# Patient Record
Sex: FEMALE | Race: WHITE | NOT HISPANIC OR LATINO
[De-identification: names, ages, dates, MRNs, and addresses within clinical notes are randomized per-mention and may not be internally consistent; named-entity substitution may affect disease eponyms.]

---

## 2017-07-28 PROBLEM — Z00.00 ENCOUNTER FOR PREVENTIVE HEALTH EXAMINATION: Status: ACTIVE | Noted: 2017-07-28

## 2017-08-29 ENCOUNTER — LABORATORY RESULT (OUTPATIENT)
Age: 62
End: 2017-08-29

## 2017-08-29 ENCOUNTER — APPOINTMENT (OUTPATIENT)
Dept: RHEUMATOLOGY | Facility: CLINIC | Age: 62
End: 2017-08-29
Payer: COMMERCIAL

## 2017-08-29 VITALS
BODY MASS INDEX: 35.35 KG/M2 | TEMPERATURE: 98.3 F | WEIGHT: 197 LBS | HEIGHT: 62.5 IN | HEART RATE: 83 BPM | DIASTOLIC BLOOD PRESSURE: 84 MMHG | SYSTOLIC BLOOD PRESSURE: 132 MMHG | OXYGEN SATURATION: 98 %

## 2017-08-29 DIAGNOSIS — K21.9 GASTRO-ESOPHAGEAL REFLUX DISEASE W/OUT ESOPHAGITIS: ICD-10-CM

## 2017-08-29 DIAGNOSIS — Z82.61 FAMILY HISTORY OF ARTHRITIS: ICD-10-CM

## 2017-08-29 DIAGNOSIS — M16.9 OSTEOARTHRITIS OF HIP, UNSPECIFIED: ICD-10-CM

## 2017-08-29 DIAGNOSIS — R94.6 ABNORMAL RESULTS OF THYROID FUNCTION STUDIES: ICD-10-CM

## 2017-08-29 DIAGNOSIS — E66.9 OBESITY, UNSPECIFIED: ICD-10-CM

## 2017-08-29 DIAGNOSIS — Z87.891 PERSONAL HISTORY OF NICOTINE DEPENDENCE: ICD-10-CM

## 2017-08-29 PROCEDURE — 99244 OFF/OP CNSLTJ NEW/EST MOD 40: CPT

## 2017-08-29 RX ORDER — IBUPROFEN 800 MG
TABLET ORAL
Refills: 0 | Status: ACTIVE | COMMUNITY

## 2017-08-29 RX ORDER — OMEPRAZOLE 40 MG/1
40 CAPSULE, DELAYED RELEASE ORAL
Qty: 30 | Refills: 3 | Status: ACTIVE | COMMUNITY
Start: 2017-08-29 | End: 1900-01-01

## 2017-08-30 LAB
T3FREE SERPL-MCNC: 2.35 PG/ML
T3RU NFR SERPL: 1.17 INDEX
THYROGLOB AB SERPL-ACNC: <20 IU/ML
THYROPEROXIDASE AB SERPL IA-ACNC: 463 IU/ML
TSH SERPL-ACNC: 6.83 UIU/ML

## 2017-09-29 ENCOUNTER — RX RENEWAL (OUTPATIENT)
Age: 62
End: 2017-09-29

## 2017-09-29 RX ORDER — MELOXICAM 15 MG/1
15 TABLET ORAL DAILY
Qty: 30 | Refills: 1 | Status: ACTIVE | COMMUNITY
Start: 2017-08-29 | End: 1900-01-01

## 2018-06-11 ENCOUNTER — APPOINTMENT (OUTPATIENT)
Dept: ORTHOPEDIC SURGERY | Facility: CLINIC | Age: 63
End: 2018-06-11
Payer: COMMERCIAL

## 2018-06-11 VITALS
BODY MASS INDEX: 34.2 KG/M2 | DIASTOLIC BLOOD PRESSURE: 82 MMHG | HEART RATE: 80 BPM | SYSTOLIC BLOOD PRESSURE: 130 MMHG | WEIGHT: 193 LBS | HEIGHT: 63 IN

## 2018-06-11 DIAGNOSIS — M25.551 PAIN IN RIGHT HIP: ICD-10-CM

## 2018-06-11 PROCEDURE — 73522 X-RAY EXAM HIPS BI 3-4 VIEWS: CPT

## 2018-06-11 PROCEDURE — 99204 OFFICE O/P NEW MOD 45 MIN: CPT

## 2018-06-12 ENCOUNTER — TRANSCRIPTION ENCOUNTER (OUTPATIENT)
Age: 63
End: 2018-06-12

## 2018-06-18 ENCOUNTER — MOBILE ON CALL (OUTPATIENT)
Age: 63
End: 2018-06-18

## 2018-06-18 LAB
CRP SERPL-MCNC: 0.7 MG/DL
ERYTHROCYTE [SEDIMENTATION RATE] IN BLOOD BY WESTERGREN METHOD: 31 MM/HR

## 2018-06-20 ENCOUNTER — MOBILE ON CALL (OUTPATIENT)
Age: 63
End: 2018-06-20

## 2018-06-22 ENCOUNTER — CHART COPY (OUTPATIENT)
Age: 63
End: 2018-06-22

## 2018-06-22 LAB
COBALT: 1.9 UG/L
CR BLD-MCNC: 2.1 UG/L

## 2018-07-09 ENCOUNTER — APPOINTMENT (OUTPATIENT)
Dept: ORTHOPEDIC SURGERY | Facility: CLINIC | Age: 63
End: 2018-07-09
Payer: COMMERCIAL

## 2018-07-09 DIAGNOSIS — Z85.9 PERSONAL HISTORY OF MALIGNANT NEOPLASM, UNSPECIFIED: ICD-10-CM

## 2018-07-09 DIAGNOSIS — Z87.39 PERSONAL HISTORY OF OTHER DISEASES OF THE MUSCULOSKELETAL SYSTEM AND CONNECTIVE TISSUE: ICD-10-CM

## 2018-07-09 PROCEDURE — 73502 X-RAY EXAM HIP UNI 2-3 VIEWS: CPT | Mod: LT

## 2018-07-09 PROCEDURE — 99214 OFFICE O/P EST MOD 30 MIN: CPT

## 2018-09-14 ENCOUNTER — MOBILE ON CALL (OUTPATIENT)
Age: 63
End: 2018-09-14

## 2018-09-17 ENCOUNTER — OUTPATIENT (OUTPATIENT)
Dept: OUTPATIENT SERVICES | Facility: HOSPITAL | Age: 63
LOS: 1 days | End: 2018-09-17
Payer: COMMERCIAL

## 2018-09-17 VITALS
WEIGHT: 197.09 LBS | OXYGEN SATURATION: 100 % | HEART RATE: 73 BPM | DIASTOLIC BLOOD PRESSURE: 86 MMHG | TEMPERATURE: 98 F | SYSTOLIC BLOOD PRESSURE: 135 MMHG | RESPIRATION RATE: 14 BRPM | HEIGHT: 63 IN

## 2018-09-17 DIAGNOSIS — E03.9 HYPOTHYROIDISM, UNSPECIFIED: ICD-10-CM

## 2018-09-17 DIAGNOSIS — M16.12 UNILATERAL PRIMARY OSTEOARTHRITIS, LEFT HIP: ICD-10-CM

## 2018-09-17 DIAGNOSIS — Z98.890 OTHER SPECIFIED POSTPROCEDURAL STATES: Chronic | ICD-10-CM

## 2018-09-17 DIAGNOSIS — Z96.641 PRESENCE OF RIGHT ARTIFICIAL HIP JOINT: Chronic | ICD-10-CM

## 2018-09-17 DIAGNOSIS — Z90.49 ACQUIRED ABSENCE OF OTHER SPECIFIED PARTS OF DIGESTIVE TRACT: Chronic | ICD-10-CM

## 2018-09-17 DIAGNOSIS — Z01.818 ENCOUNTER FOR OTHER PREPROCEDURAL EXAMINATION: ICD-10-CM

## 2018-09-17 DIAGNOSIS — Z90.710 ACQUIRED ABSENCE OF BOTH CERVIX AND UTERUS: Chronic | ICD-10-CM

## 2018-09-17 DIAGNOSIS — Z29.9 ENCOUNTER FOR PROPHYLACTIC MEASURES, UNSPECIFIED: ICD-10-CM

## 2018-09-17 LAB
ANION GAP SERPL CALC-SCNC: 12 MMOL/L — SIGNIFICANT CHANGE UP (ref 5–17)
BLD GP AB SCN SERPL QL: NEGATIVE — SIGNIFICANT CHANGE UP
BUN SERPL-MCNC: 20 MG/DL — SIGNIFICANT CHANGE UP (ref 7–23)
CALCIUM SERPL-MCNC: 9.4 MG/DL — SIGNIFICANT CHANGE UP (ref 8.4–10.5)
CHLORIDE SERPL-SCNC: 104 MMOL/L — SIGNIFICANT CHANGE UP (ref 96–108)
CO2 SERPL-SCNC: 24 MMOL/L — SIGNIFICANT CHANGE UP (ref 22–31)
CREAT SERPL-MCNC: 0.9 MG/DL — SIGNIFICANT CHANGE UP (ref 0.5–1.3)
GLUCOSE SERPL-MCNC: 75 MG/DL — SIGNIFICANT CHANGE UP (ref 70–99)
HBA1C BLD-MCNC: 5.2 % — SIGNIFICANT CHANGE UP (ref 4–5.6)
HCT VFR BLD CALC: 37.8 % — SIGNIFICANT CHANGE UP (ref 34.5–45)
HGB BLD-MCNC: 12.1 G/DL — SIGNIFICANT CHANGE UP (ref 11.5–15.5)
MCHC RBC-ENTMCNC: 30.7 PG — SIGNIFICANT CHANGE UP (ref 27–34)
MCHC RBC-ENTMCNC: 32 GM/DL — SIGNIFICANT CHANGE UP (ref 32–36)
MCV RBC AUTO: 95.9 FL — SIGNIFICANT CHANGE UP (ref 80–100)
PLATELET # BLD AUTO: 255 K/UL — SIGNIFICANT CHANGE UP (ref 150–400)
POTASSIUM SERPL-MCNC: 4.4 MMOL/L — SIGNIFICANT CHANGE UP (ref 3.5–5.3)
POTASSIUM SERPL-SCNC: 4.4 MMOL/L — SIGNIFICANT CHANGE UP (ref 3.5–5.3)
RBC # BLD: 3.94 M/UL — SIGNIFICANT CHANGE UP (ref 3.8–5.2)
RBC # FLD: 13.6 % — SIGNIFICANT CHANGE UP (ref 10.3–14.5)
RH IG SCN BLD-IMP: POSITIVE — SIGNIFICANT CHANGE UP
SODIUM SERPL-SCNC: 140 MMOL/L — SIGNIFICANT CHANGE UP (ref 135–145)
WBC # BLD: 7.37 K/UL — SIGNIFICANT CHANGE UP (ref 3.8–10.5)
WBC # FLD AUTO: 7.37 K/UL — SIGNIFICANT CHANGE UP (ref 3.8–10.5)

## 2018-09-17 PROCEDURE — 87640 STAPH A DNA AMP PROBE: CPT

## 2018-09-17 PROCEDURE — 86901 BLOOD TYPING SEROLOGIC RH(D): CPT

## 2018-09-17 PROCEDURE — 87641 MR-STAPH DNA AMP PROBE: CPT

## 2018-09-17 PROCEDURE — 80048 BASIC METABOLIC PNL TOTAL CA: CPT

## 2018-09-17 PROCEDURE — 85027 COMPLETE CBC AUTOMATED: CPT

## 2018-09-17 PROCEDURE — 86850 RBC ANTIBODY SCREEN: CPT

## 2018-09-17 PROCEDURE — G0463: CPT

## 2018-09-17 PROCEDURE — 83036 HEMOGLOBIN GLYCOSYLATED A1C: CPT

## 2018-09-17 PROCEDURE — 86900 BLOOD TYPING SEROLOGIC ABO: CPT

## 2018-09-17 RX ORDER — VANCOMYCIN HCL 1 G
1250 VIAL (EA) INTRAVENOUS ONCE
Qty: 0 | Refills: 0 | Status: DISCONTINUED | OUTPATIENT
Start: 2018-09-28 | End: 2018-09-29

## 2018-09-17 NOTE — H&P PST ADULT - NEGATIVE NEUROLOGICAL SYMPTOMS
no headache/no vertigo/no syncope/no paresthesias/no tremors/no generalized seizures/no focal seizures

## 2018-09-17 NOTE — H&P PST ADULT - HISTORY OF PRESENT ILLNESS
Mrs. Pemberton is a 62 year old woman with PMH former smoker, hypothyroidism and osteoarthritis s/p R THR who has had worsening pain in left hip and increased difficulty walking and is now scheduled for L THR.

## 2018-09-17 NOTE — H&P PST ADULT - ASSESSMENT
CAPRINI SCORE [CLOT]    AGE RELATED RISK FACTORS                                                       MOBILITY RELATED FACTORS  [ ] Age 41-60 years                                            (1 Point)                  [ ] Bed rest                                                        (1 Point)  [x ] Age: 61-74 years                                           (2 Points)                 [ ] Plaster cast                                                   (2 Points)  [ ] Age= 75 years                                              (3 Points)                 [ ] Bed bound for more than 72 hours                 (2 Points)    DISEASE RELATED RISK FACTORS                                               GENDER SPECIFIC FACTORS  [ ] Edema in the lower extremities                       (1 Point)                  [ ] Pregnancy                                                     (1 Point)  [ ] Varicose veins                                               (1 Point)                  [ ] Post-partum < 6 weeks                                   (1 Point)             [x ] BMI > 25 Kg/m2                                            (1 Point)                  [ ] Hormonal therapy  or oral contraception          (1 Point)                 [ ] Sepsis (in the previous month)                        (1 Point)                  [ ] History of pregnancy complications                 (1 point)  [ ] Pneumonia or serious lung disease                                               [ ] Unexplained or recurrent                     (1 Point)           (in the previous month)                               (1 Point)  [ ] Abnormal pulmonary function test                     (1 Point)                 SURGERY RELATED RISK FACTORS  [ ] Acute myocardial infarction                              (1 Point)                 [ ]  Section                                             (1 Point)  [ ] Congestive heart failure (in the previous month)  (1 Point)               [ ] Minor surgery                                                  (1 Point)   [ ] Inflammatory bowel disease                             (1 Point)                 [ ] Arthroscopic surgery                                        (2 Points)  [ ] Central venous access                                      (2 Points)                [ ] General surgery lasting more than 45 minutes   (2 Points)       [ ] Stroke (in the previous month)                          (5 Points)               [x ] Elective arthroplasty                                         (5 Points)                                                                                                                                               HEMATOLOGY RELATED FACTORS                                                 TRAUMA RELATED RISK FACTORS  [ ] Prior episodes of VTE                                     (3 Points)                 [ ] Fracture of the hip, pelvis, or leg                       (5 Points)  [ ] Positive family history for VTE                         (3 Points)                 [ ] Acute spinal cord injury (in the previous month)  (5 Points)  [ ] Prothrombin 61616 A                                     (3 Points)                 [ ] Paralysis  (less than 1 month)                             (5 Points)  [ ] Factor V Leiden                                             (3 Points)                  [ ] Multiple Trauma within 1 month                        (5 Points)  [ ] Lupus anticoagulants                                     (3 Points)                                                           [ ] Anticardiolipin antibodies                               (3 Points)                                                       [ ] High homocysteine in the blood                      (3 Points)                                             [ ] Other congenital or acquired thrombophilia      (3 Points)                                                [ ] Heparin induced thrombocytopenia                  (3 Points)                                          Total Score [    8      ]

## 2018-09-17 NOTE — H&P PST ADULT - PSH
H/O cosmetic surgery  Nose 2000  H/O hemorrhoidectomy  2000  H/O: hysterectomy  age 49  History of hip replacement, total, right  2004  S/P laparoscopic cholecystectomy  4/2018

## 2018-09-17 NOTE — H&P PST ADULT - PMH
Former smoker, stopped smoking many years ago  Quit 1992  Hypothyroidism, unspecified type    Primary osteoarthritis involving multiple joints

## 2018-09-17 NOTE — H&P PST ADULT - ALLERGY TYPES
indoor environmental allergies/reactions to medicines/reactions to food/raw almonds, tree nuts, fresh fruit but not citrus/outdoor environmental allergies

## 2018-09-17 NOTE — H&P PST ADULT - PROBLEM SELECTOR PLAN 1
Left total hip replacement scheduled  Labs pending  MRSA nasal swab pending  Medical evaluation is 9/18/18  Dental evaluation in chart.  Preop instructions given  Zofran preop for hx of severe n/v with previous surgeries/anesthesia Left total hip replacement scheduled  Labs pending  MRSA nasal swab pending  Medical evaluation is 9/18/18  Dental evaluation in chart.  Preop instructions given  Recommend antiemetic before surgery, received Zofran and Reglan last surgery with good results.

## 2018-09-17 NOTE — H&P PST ADULT - NSANTHOSAYNRD_GEN_A_CORE
No. TIM screening performed.  STOP BANG Legend: 0-2 = LOW Risk; 3-4 = INTERMEDIATE Risk; 5-8 = HIGH Risk

## 2018-09-18 LAB
MRSA PCR RESULT.: SIGNIFICANT CHANGE UP
S AUREUS DNA NOSE QL NAA+PROBE: SIGNIFICANT CHANGE UP

## 2018-09-27 ENCOUNTER — TRANSCRIPTION ENCOUNTER (OUTPATIENT)
Age: 63
End: 2018-09-27

## 2018-09-27 PROBLEM — M16.12 ARTHRITIS OF LEFT HIP: Status: RESOLVED | Noted: 2018-06-11 | Resolved: 2018-09-27

## 2018-09-27 PROBLEM — M25.50 CHRONIC JOINT PAIN: Status: RESOLVED | Noted: 2017-08-29 | Resolved: 2018-09-27

## 2018-09-28 ENCOUNTER — INPATIENT (INPATIENT)
Facility: HOSPITAL | Age: 63
LOS: 0 days | Discharge: HOME | DRG: 470 | End: 2018-09-29
Attending: ORTHOPAEDIC SURGERY | Admitting: ORTHOPAEDIC SURGERY
Payer: COMMERCIAL

## 2018-09-28 ENCOUNTER — APPOINTMENT (OUTPATIENT)
Dept: ORTHOPEDIC SURGERY | Facility: HOSPITAL | Age: 63
End: 2018-09-28

## 2018-09-28 VITALS
SYSTOLIC BLOOD PRESSURE: 154 MMHG | HEIGHT: 63 IN | WEIGHT: 197.09 LBS | DIASTOLIC BLOOD PRESSURE: 86 MMHG | TEMPERATURE: 98 F | OXYGEN SATURATION: 100 % | RESPIRATION RATE: 20 BRPM | HEART RATE: 74 BPM

## 2018-09-28 DIAGNOSIS — G89.29 PAIN IN UNSPECIFIED JOINT: ICD-10-CM

## 2018-09-28 DIAGNOSIS — M16.12 UNILATERAL PRIMARY OSTEOARTHRITIS, LEFT HIP: ICD-10-CM

## 2018-09-28 DIAGNOSIS — Z96.641 PRESENCE OF RIGHT ARTIFICIAL HIP JOINT: Chronic | ICD-10-CM

## 2018-09-28 DIAGNOSIS — Z90.49 ACQUIRED ABSENCE OF OTHER SPECIFIED PARTS OF DIGESTIVE TRACT: Chronic | ICD-10-CM

## 2018-09-28 DIAGNOSIS — M25.50 PAIN IN UNSPECIFIED JOINT: ICD-10-CM

## 2018-09-28 DIAGNOSIS — Z98.890 OTHER SPECIFIED POSTPROCEDURAL STATES: Chronic | ICD-10-CM

## 2018-09-28 DIAGNOSIS — Z90.710 ACQUIRED ABSENCE OF BOTH CERVIX AND UTERUS: Chronic | ICD-10-CM

## 2018-09-28 LAB — RH IG SCN BLD-IMP: POSITIVE — SIGNIFICANT CHANGE UP

## 2018-09-28 PROCEDURE — 27130 TOTAL HIP ARTHROPLASTY: CPT | Mod: LT

## 2018-09-28 PROCEDURE — 72170 X-RAY EXAM OF PELVIS: CPT | Mod: 26

## 2018-09-28 RX ORDER — OXYCODONE HYDROCHLORIDE 5 MG/1
10 TABLET ORAL EVERY 4 HOURS
Qty: 0 | Refills: 0 | Status: DISCONTINUED | OUTPATIENT
Start: 2018-09-28 | End: 2018-09-29

## 2018-09-28 RX ORDER — POLYETHYLENE GLYCOL 3350 17 G/17G
17 POWDER, FOR SOLUTION ORAL DAILY
Qty: 0 | Refills: 0 | Status: DISCONTINUED | OUTPATIENT
Start: 2018-09-28 | End: 2018-09-29

## 2018-09-28 RX ORDER — ACETAMINOPHEN 500 MG
1000 TABLET ORAL ONCE
Qty: 0 | Refills: 0 | Status: COMPLETED | OUTPATIENT
Start: 2018-09-28 | End: 2018-09-28

## 2018-09-28 RX ORDER — ACETAMINOPHEN 500 MG
975 TABLET ORAL ONCE
Qty: 0 | Refills: 0 | Status: COMPLETED | OUTPATIENT
Start: 2018-09-28 | End: 2018-09-28

## 2018-09-28 RX ORDER — SODIUM CHLORIDE 9 MG/ML
500 INJECTION INTRAMUSCULAR; INTRAVENOUS; SUBCUTANEOUS ONCE
Qty: 0 | Refills: 0 | Status: COMPLETED | OUTPATIENT
Start: 2018-09-28 | End: 2018-09-28

## 2018-09-28 RX ORDER — SODIUM CHLORIDE 9 MG/ML
3 INJECTION INTRAMUSCULAR; INTRAVENOUS; SUBCUTANEOUS EVERY 8 HOURS
Qty: 0 | Refills: 0 | Status: DISCONTINUED | OUTPATIENT
Start: 2018-09-28 | End: 2018-09-28

## 2018-09-28 RX ORDER — INFLUENZA VIRUS VACCINE 15; 15; 15; 15 UG/.5ML; UG/.5ML; UG/.5ML; UG/.5ML
0.5 SUSPENSION INTRAMUSCULAR ONCE
Qty: 0 | Refills: 0 | Status: DISCONTINUED | OUTPATIENT
Start: 2018-09-28 | End: 2018-09-29

## 2018-09-28 RX ORDER — PANTOPRAZOLE SODIUM 20 MG/1
40 TABLET, DELAYED RELEASE ORAL ONCE
Qty: 0 | Refills: 0 | Status: COMPLETED | OUTPATIENT
Start: 2018-09-28 | End: 2018-09-28

## 2018-09-28 RX ORDER — ACETAMINOPHEN 500 MG
975 TABLET ORAL EVERY 8 HOURS
Qty: 0 | Refills: 0 | Status: DISCONTINUED | OUTPATIENT
Start: 2018-09-29 | End: 2018-09-29

## 2018-09-28 RX ORDER — DOCUSATE SODIUM 100 MG
100 CAPSULE ORAL THREE TIMES A DAY
Qty: 0 | Refills: 0 | Status: DISCONTINUED | OUTPATIENT
Start: 2018-09-28 | End: 2018-09-29

## 2018-09-28 RX ORDER — VANCOMYCIN HCL 1 G
1250 VIAL (EA) INTRAVENOUS EVERY 8 HOURS
Qty: 0 | Refills: 0 | Status: DISCONTINUED | OUTPATIENT
Start: 2018-09-28 | End: 2018-09-29

## 2018-09-28 RX ORDER — OXYCODONE HYDROCHLORIDE 5 MG/1
5 TABLET ORAL EVERY 4 HOURS
Qty: 0 | Refills: 0 | Status: DISCONTINUED | OUTPATIENT
Start: 2018-09-28 | End: 2018-09-29

## 2018-09-28 RX ORDER — SODIUM CHLORIDE 9 MG/ML
500 INJECTION INTRAMUSCULAR; INTRAVENOUS; SUBCUTANEOUS ONCE
Qty: 0 | Refills: 0 | Status: COMPLETED | OUTPATIENT
Start: 2018-09-29 | End: 2018-09-29

## 2018-09-28 RX ORDER — TRAMADOL HYDROCHLORIDE 50 MG/1
50 TABLET ORAL ONCE
Qty: 0 | Refills: 0 | Status: DISCONTINUED | OUTPATIENT
Start: 2018-09-28 | End: 2018-09-28

## 2018-09-28 RX ORDER — KETOROLAC TROMETHAMINE 30 MG/ML
30 SYRINGE (ML) INJECTION EVERY 8 HOURS
Qty: 0 | Refills: 0 | Status: DISCONTINUED | OUTPATIENT
Start: 2018-09-28 | End: 2018-09-29

## 2018-09-28 RX ORDER — DEXAMETHASONE 0.5 MG/5ML
8 ELIXIR ORAL ONCE
Qty: 0 | Refills: 0 | Status: COMPLETED | OUTPATIENT
Start: 2018-09-29 | End: 2018-09-29

## 2018-09-28 RX ORDER — MAGNESIUM HYDROXIDE 400 MG/1
30 TABLET, CHEWABLE ORAL DAILY
Qty: 0 | Refills: 0 | Status: DISCONTINUED | OUTPATIENT
Start: 2018-09-28 | End: 2018-09-29

## 2018-09-28 RX ORDER — HYDROMORPHONE HYDROCHLORIDE 2 MG/ML
0.5 INJECTION INTRAMUSCULAR; INTRAVENOUS; SUBCUTANEOUS
Qty: 0 | Refills: 0 | Status: DISCONTINUED | OUTPATIENT
Start: 2018-09-28 | End: 2018-09-28

## 2018-09-28 RX ORDER — ASPIRIN/CALCIUM CARB/MAGNESIUM 324 MG
81 TABLET ORAL AT BEDTIME
Qty: 0 | Refills: 0 | Status: DISCONTINUED | OUTPATIENT
Start: 2018-09-29 | End: 2018-09-29

## 2018-09-28 RX ORDER — SENNA PLUS 8.6 MG/1
2 TABLET ORAL AT BEDTIME
Qty: 0 | Refills: 0 | Status: DISCONTINUED | OUTPATIENT
Start: 2018-09-28 | End: 2018-09-29

## 2018-09-28 RX ORDER — LIDOCAINE HCL 20 MG/ML
0.2 VIAL (ML) INJECTION ONCE
Qty: 0 | Refills: 0 | Status: DISCONTINUED | OUTPATIENT
Start: 2018-09-28 | End: 2018-09-28

## 2018-09-28 RX ORDER — TRAMADOL HYDROCHLORIDE 50 MG/1
50 TABLET ORAL EVERY 6 HOURS
Qty: 0 | Refills: 0 | Status: DISCONTINUED | OUTPATIENT
Start: 2018-09-28 | End: 2018-09-29

## 2018-09-28 RX ORDER — SODIUM CHLORIDE 9 MG/ML
1000 INJECTION INTRAMUSCULAR; INTRAVENOUS; SUBCUTANEOUS
Qty: 0 | Refills: 0 | Status: DISCONTINUED | OUTPATIENT
Start: 2018-09-28 | End: 2018-09-29

## 2018-09-28 RX ORDER — ACETAMINOPHEN 500 MG
1000 TABLET ORAL ONCE
Qty: 0 | Refills: 0 | Status: COMPLETED | OUTPATIENT
Start: 2018-09-29 | End: 2018-09-29

## 2018-09-28 RX ORDER — PANTOPRAZOLE SODIUM 20 MG/1
40 TABLET, DELAYED RELEASE ORAL DAILY
Qty: 0 | Refills: 0 | Status: DISCONTINUED | OUTPATIENT
Start: 2018-09-28 | End: 2018-09-29

## 2018-09-28 RX ORDER — ONDANSETRON 8 MG/1
4 TABLET, FILM COATED ORAL EVERY 6 HOURS
Qty: 0 | Refills: 0 | Status: DISCONTINUED | OUTPATIENT
Start: 2018-09-28 | End: 2018-09-29

## 2018-09-28 RX ADMIN — PANTOPRAZOLE SODIUM 40 MILLIGRAM(S): 20 TABLET, DELAYED RELEASE ORAL at 08:52

## 2018-09-28 RX ADMIN — SODIUM CHLORIDE 1000 MILLILITER(S): 9 INJECTION INTRAMUSCULAR; INTRAVENOUS; SUBCUTANEOUS at 17:43

## 2018-09-28 RX ADMIN — Medication 100 MILLIGRAM(S): at 21:23

## 2018-09-28 RX ADMIN — SODIUM CHLORIDE 3 MILLILITER(S): 9 INJECTION INTRAMUSCULAR; INTRAVENOUS; SUBCUTANEOUS at 08:53

## 2018-09-28 RX ADMIN — SODIUM CHLORIDE 1000 MILLILITER(S): 9 INJECTION INTRAMUSCULAR; INTRAVENOUS; SUBCUTANEOUS at 13:57

## 2018-09-28 RX ADMIN — Medication 166.67 MILLIGRAM(S): at 21:23

## 2018-09-28 RX ADMIN — Medication 1000 MILLIGRAM(S): at 22:30

## 2018-09-28 RX ADMIN — SODIUM CHLORIDE 75 MILLILITER(S): 9 INJECTION INTRAMUSCULAR; INTRAVENOUS; SUBCUTANEOUS at 13:54

## 2018-09-28 RX ADMIN — TRAMADOL HYDROCHLORIDE 50 MILLIGRAM(S): 50 TABLET ORAL at 09:03

## 2018-09-28 RX ADMIN — Medication 400 MILLIGRAM(S): at 21:23

## 2018-09-28 RX ADMIN — Medication 975 MILLIGRAM(S): at 08:52

## 2018-09-28 RX ADMIN — Medication 30 MILLIGRAM(S): at 21:23

## 2018-09-28 RX ADMIN — Medication 30 MILLIGRAM(S): at 22:30

## 2018-09-28 RX ADMIN — HYDROMORPHONE HYDROCHLORIDE 0.5 MILLIGRAM(S): 2 INJECTION INTRAMUSCULAR; INTRAVENOUS; SUBCUTANEOUS at 14:00

## 2018-09-28 RX ADMIN — HYDROMORPHONE HYDROCHLORIDE 0.5 MILLIGRAM(S): 2 INJECTION INTRAMUSCULAR; INTRAVENOUS; SUBCUTANEOUS at 13:39

## 2018-09-28 NOTE — PHYSICAL THERAPY INITIAL EVALUATION ADULT - ADDITIONAL COMMENTS
Pt lives with her spouse in a split level home +5 steps to enter with HR, 6 + 6 steps once inside with HR. Pt reports she was ambulating independently with use of straight cane and was independent with all ADLs prior

## 2018-09-28 NOTE — PHYSICAL THERAPY INITIAL EVALUATION ADULT - STRENGTHENING, PT EVAL
GOAL: Pt will improve bilateral LE strength by 1/2 grade on MMT, for increased limb stability, to improve gait and facilitate stair negotiation in 2 weeks.

## 2018-09-28 NOTE — PATIENT PROFILE ADULT. - PATIENT REPRESENTATIVE: ( YOU CAN CHOOSE ANY PERSON THAT CAN ASSIST YOU WITH YOUR HEALTH CARE PREFERENCES, DOES NOT HAVE TO BE A SPOUSE, IMMEDIATE FAMILY OR SIGNIFICANT OTHER/PARTNER)
"Libra Pena's goals for this visit include: recheckj  She requests these members of her care team be copied on today's visit information:     PCP: Carmelina Sims    Referring Provider:  No referring provider defined for this encounter.    Chief Complaint   Patient presents with     RECHECK       Initial /70  Pulse 56  Ht 1.626 m (5' 4\")  Wt 69.9 kg (154 lb)  LMP 12/16/2011  SpO2 99%  BMI 26.43 kg/m2 Estimated body mass index is 26.43 kg/(m^2) as calculated from the following:    Height as of this encounter: 1.626 m (5' 4\").    Weight as of this encounter: 69.9 kg (154 lb).  Medication Reconciliation: complete    "
Declines

## 2018-09-28 NOTE — PHYSICAL THERAPY INITIAL EVALUATION ADULT - MANUAL MUSCLE TESTING RESULTS, REHAB EVAL
B/L UEs and RLE grossly 4/5, LLE not tested with MMT 2/2 post op c/o stiffness/grossly assessed due to

## 2018-09-28 NOTE — PHYSICAL THERAPY INITIAL EVALUATION ADULT - DISCHARGE DISPOSITION, PT EVAL
TBD upon further functional evaluation Home with Home PT to assess safety, increase strength and endurance assisting with functional activities and ADLs. Pt given straight cane.Pt requires Rolling walker for home.

## 2018-09-28 NOTE — BRIEF OPERATIVE NOTE - PROCEDURE
<<-----Click on this checkbox to enter Procedure Arthroplasty, hip, total, anterior approach  09/28/2018    Active  AROSS7

## 2018-09-28 NOTE — PHYSICAL THERAPY INITIAL EVALUATION ADULT - PERTINENT HX OF CURRENT PROBLEM, REHAB EVAL
62 year old woman with PMH former smoker, hypothyroidism and osteoarthritis s/p R THR who has had worsening pain in left hip and increased difficulty walking, now s/p L anterior HOLA on 9/28

## 2018-09-28 NOTE — CHART NOTE - NSCHARTNOTEFT_GEN_A_CORE
Subjective: Patient is resting comfortably in PACU. Patient states that she feels a mild burning sensation around her left hip however denies any pain. Patient further denies fever, chills, nausea, chills, chest pain or shortness of breath.     Objective:  Vital Signs Last 24 Hrs  T(C): 36.2 (28 Sep 2018 15:00), Max: 36.6 (28 Sep 2018 08:22)  T(F): 97.2 (28 Sep 2018 15:00), Max: 97.9 (28 Sep 2018 08:22)  HR: 65 (28 Sep 2018 15:00) (57 - 74)  BP: 130/80 (28 Sep 2018 15:00) (112/69 - 154/86)  BP(mean): 100 (28 Sep 2018 15:00) (91 - 107)  RR: 18 (28 Sep 2018 15:00) (14 - 20)  SpO2: 98% (28 Sep 2018 15:00) (98% - 100%)    PE:  Alert and oriented, NAD  Cardiac: + S1, S2, regular rate and rhythm  Pulm: lungs clear to ascultation bilaterally, no wheezes, rales or rhonchi   LLE: Aquacel dressing clean, dry and intact        Compartments soft, calves soft and nontender bilaterally        DF/PF, EHL/FHL strength 5/5        Sensation equally intact bilaterally         2 + DP pulse     Post Op x-ray pelvis: bilateral HOLA; right press fit, left cemented     Assessment: 62 year old female post-op left total hip arthroplasty for left hip osteoarthritis. Patient's vital signs are stable and presents with no known acute abnormalities.     Plan:   PT/OT weight bearing as tolerated, anterior precautions   DVT Prophylaxis: ASA 81 mg BID  Abx Prophylaxis: Vancomycin x 1 dose  Pain management: Tylenol 975 mg every 8 hours, Toradol 30 mg IV push every 8 hours x 2 days, Oxycodone 5-10 mg every 4 hours as needed  Check AM labs CBC, BMP  Continue current treatment  Dispo planning TBA    YESENIA Kaur  Orthopedics   Beeper [id number] Subjective: Patient is resting comfortably in PACU. Patient states that she feels a mild burning sensation around her left hip however denies any pain. Patient further denies fever, chills, nausea, chills, chest pain or shortness of breath.     Objective:  Vital Signs Last 24 Hrs  T(C): 36.2 (28 Sep 2018 15:00), Max: 36.6 (28 Sep 2018 08:22)  T(F): 97.2 (28 Sep 2018 15:00), Max: 97.9 (28 Sep 2018 08:22)  HR: 65 (28 Sep 2018 15:00) (57 - 74)  BP: 130/80 (28 Sep 2018 15:00) (112/69 - 154/86)  BP(mean): 100 (28 Sep 2018 15:00) (91 - 107)  RR: 18 (28 Sep 2018 15:00) (14 - 20)  SpO2: 98% (28 Sep 2018 15:00) (98% - 100%)    PE:  Alert and oriented, NAD  Cardiac: + S1, S2, regular rate and rhythm  Pulm: lungs clear to ascultation bilaterally, no wheezes, rales or rhonchi   LLE: Aquacel dressing clean, dry and intact        Compartments soft, calves soft and nontender bilaterally        DF/PF, EHL/FHL strength 5/5        Sensation equally intact bilaterally         2 + DP pulse     Post Op x-ray pelvis: bilateral HOLA; right press fit, left cemented     Assessment: 62 year old female post-op left total hip arthroplasty for left hip osteoarthritis. Patient's vital signs are stable and presents with no known acute abnormalities.     Plan:   PT/OT weight bearing as tolerated, anterior precautions   DVT Prophylaxis: ASA 81 mg BID  Abx Prophylaxis: Vancomycin x 1 dose  Pain management: Tylenol 975 mg every 8 hours, Toradol 30 mg IV push every 8 hours x 2 days, Oxycodone 5-10 mg every 4 hours as needed  Check AM labs CBC, BMP  Continue current treatment  Dispo planning TBA    YESENIA Kaur  Orthopedics   Beeper [id number]      I agree with the above note and have personally seen and examined this patient. All pertinent films have been reviewed. Please refer to clinical documentation of the history, physical examinations, data summary, and both assessment and plan as documented above and with which I agree.    pain controlled  not yet oob w pt    xr: s/po L HOLA, no complications    afvss  nad  lle  dressing cdi  firing q/h, 5/5 ta/ehl/gcs  silt l4-s1  2+ dp    doing well s/p L HOLA  vanc x 24 hrs  asa 81 bid x 4 weeks  f/u office 2 weeks for wound check  wbat, pt/ot    Thiago Becerril MD  Attending Orthopedic Surgeon

## 2018-09-28 NOTE — PHYSICAL THERAPY INITIAL EVALUATION ADULT - ACTIVE RANGE OF MOTION EXAMINATION, REHAB EVAL
Left LE Active ROM was WFL (within functional limits)/LLE hip flexion AROM limited 2/2 stiffness/Left UE Active ROM was WFL (within functional limits)/Right UE Active ROM was WFL (within functional limits)/Right LE Active ROM was WFL (within functional limits)

## 2018-09-28 NOTE — PRE-OP CHECKLIST - HIBICLENS SHOWER 3 DATE
Letter sent about benign path report and that no further tx is needed. I asked the patient to call with questions/ concerns. 28-Sep-2018

## 2018-09-29 ENCOUNTER — TRANSCRIPTION ENCOUNTER (OUTPATIENT)
Age: 63
End: 2018-09-29

## 2018-09-29 VITALS
OXYGEN SATURATION: 95 % | TEMPERATURE: 98 F | RESPIRATION RATE: 18 BRPM | DIASTOLIC BLOOD PRESSURE: 69 MMHG | SYSTOLIC BLOOD PRESSURE: 111 MMHG | HEART RATE: 90 BPM

## 2018-09-29 LAB
ANION GAP SERPL CALC-SCNC: 9 MMOL/L — SIGNIFICANT CHANGE UP (ref 5–17)
BUN SERPL-MCNC: 17 MG/DL — SIGNIFICANT CHANGE UP (ref 7–23)
CALCIUM SERPL-MCNC: 8.2 MG/DL — LOW (ref 8.4–10.5)
CHLORIDE SERPL-SCNC: 108 MMOL/L — SIGNIFICANT CHANGE UP (ref 96–108)
CO2 SERPL-SCNC: 21 MMOL/L — LOW (ref 22–31)
CREAT SERPL-MCNC: 0.86 MG/DL — SIGNIFICANT CHANGE UP (ref 0.5–1.3)
GLUCOSE SERPL-MCNC: 92 MG/DL — SIGNIFICANT CHANGE UP (ref 70–99)
HCT VFR BLD CALC: 28.6 % — LOW (ref 34.5–45)
HGB BLD-MCNC: 9.3 G/DL — LOW (ref 11.5–15.5)
MCHC RBC-ENTMCNC: 30.6 PG — SIGNIFICANT CHANGE UP (ref 27–34)
MCHC RBC-ENTMCNC: 32.5 GM/DL — SIGNIFICANT CHANGE UP (ref 32–36)
MCV RBC AUTO: 94.1 FL — SIGNIFICANT CHANGE UP (ref 80–100)
PLATELET # BLD AUTO: 227 K/UL — SIGNIFICANT CHANGE UP (ref 150–400)
POTASSIUM SERPL-MCNC: 4.4 MMOL/L — SIGNIFICANT CHANGE UP (ref 3.5–5.3)
POTASSIUM SERPL-SCNC: 4.4 MMOL/L — SIGNIFICANT CHANGE UP (ref 3.5–5.3)
RBC # BLD: 3.04 M/UL — LOW (ref 3.8–5.2)
RBC # FLD: 13.6 % — SIGNIFICANT CHANGE UP (ref 10.3–14.5)
SODIUM SERPL-SCNC: 138 MMOL/L — SIGNIFICANT CHANGE UP (ref 135–145)
WBC # BLD: 13.37 K/UL — HIGH (ref 3.8–10.5)
WBC # FLD AUTO: 13.37 K/UL — HIGH (ref 3.8–10.5)

## 2018-09-29 PROCEDURE — C1769: CPT

## 2018-09-29 PROCEDURE — 97165 OT EVAL LOW COMPLEX 30 MIN: CPT

## 2018-09-29 PROCEDURE — 72170 X-RAY EXAM OF PELVIS: CPT

## 2018-09-29 PROCEDURE — 97161 PT EVAL LOW COMPLEX 20 MIN: CPT

## 2018-09-29 PROCEDURE — 85027 COMPLETE CBC AUTOMATED: CPT

## 2018-09-29 PROCEDURE — 80048 BASIC METABOLIC PNL TOTAL CA: CPT

## 2018-09-29 PROCEDURE — 76000 FLUOROSCOPY <1 HR PHYS/QHP: CPT

## 2018-09-29 PROCEDURE — 97116 GAIT TRAINING THERAPY: CPT

## 2018-09-29 PROCEDURE — 86900 BLOOD TYPING SEROLOGIC ABO: CPT

## 2018-09-29 PROCEDURE — 86901 BLOOD TYPING SEROLOGIC RH(D): CPT

## 2018-09-29 PROCEDURE — 97530 THERAPEUTIC ACTIVITIES: CPT

## 2018-09-29 PROCEDURE — C1889: CPT

## 2018-09-29 PROCEDURE — C1713: CPT

## 2018-09-29 PROCEDURE — C1776: CPT

## 2018-09-29 PROCEDURE — 82962 GLUCOSE BLOOD TEST: CPT

## 2018-09-29 RX ORDER — ASPIRIN/CALCIUM CARB/MAGNESIUM 324 MG
1 TABLET ORAL
Qty: 30 | Refills: 0
Start: 2018-09-29 | End: 2018-10-28

## 2018-09-29 RX ORDER — LEVOTHYROXINE SODIUM 125 MCG
50 TABLET ORAL DAILY
Qty: 0 | Refills: 0 | Status: DISCONTINUED | OUTPATIENT
Start: 2018-09-29 | End: 2018-09-29

## 2018-09-29 RX ORDER — IBUPROFEN 200 MG
1 TABLET ORAL
Qty: 0 | Refills: 0 | COMMUNITY

## 2018-09-29 RX ORDER — TRAMADOL HYDROCHLORIDE 50 MG/1
1 TABLET ORAL
Qty: 0 | Refills: 0 | COMMUNITY
Start: 2018-09-29

## 2018-09-29 RX ORDER — TRAMADOL HYDROCHLORIDE 50 MG/1
1 TABLET ORAL
Qty: 28 | Refills: 0
Start: 2018-09-29 | End: 2018-10-05

## 2018-09-29 RX ORDER — ACETAMINOPHEN 500 MG
3 TABLET ORAL
Qty: 0 | Refills: 0 | DISCHARGE
Start: 2018-09-29

## 2018-09-29 RX ADMIN — Medication 101.6 MILLIGRAM(S): at 05:40

## 2018-09-29 RX ADMIN — Medication 30 MILLIGRAM(S): at 05:40

## 2018-09-29 RX ADMIN — Medication 400 MILLIGRAM(S): at 05:40

## 2018-09-29 RX ADMIN — Medication 975 MILLIGRAM(S): at 12:33

## 2018-09-29 RX ADMIN — Medication 30 MILLIGRAM(S): at 06:47

## 2018-09-29 RX ADMIN — Medication 166.67 MILLIGRAM(S): at 05:40

## 2018-09-29 RX ADMIN — SODIUM CHLORIDE 1000 MILLILITER(S): 9 INJECTION INTRAMUSCULAR; INTRAVENOUS; SUBCUTANEOUS at 05:41

## 2018-09-29 RX ADMIN — Medication 100 MILLIGRAM(S): at 13:30

## 2018-09-29 RX ADMIN — Medication 100 MILLIGRAM(S): at 05:40

## 2018-09-29 RX ADMIN — PANTOPRAZOLE SODIUM 40 MILLIGRAM(S): 20 TABLET, DELAYED RELEASE ORAL at 12:33

## 2018-09-29 RX ADMIN — Medication 1000 MILLIGRAM(S): at 06:47

## 2018-09-29 RX ADMIN — Medication 975 MILLIGRAM(S): at 12:45

## 2018-09-29 NOTE — PROGRESS NOTE ADULT - ATTENDING COMMENTS
I agree with the above note and have personally seen and examined this patient. All pertinent films have been reviewed. Please refer to clinical documentation of the history, physical examinations, data summary, and both assessment and plan as documented above and with which I agree.    pain controlled  slightly dizzy on standing with pt yesterday but then recovered and she walked around unit last night    afvss  nad  lle  dressing cdi  firing q/h, 5/5 ta/ehl/gcs  silt l4-s1  2+ dp    doing well s/p L HOLA  asa 81 bid x 4 weeks  f/u office 2 weeks for wound check  wbat, pt/ot  likely dispo home today vs tomorrow pending pt progress    Thiago Becerril MD  Attending Orthopedic Surgeon.

## 2018-09-29 NOTE — DISCHARGE NOTE ADULT - CARE PROVIDER_API CALL
Thiago Becerril (MD), Orthopedics  [address] [address]  Phone: [phone]  Fax: [phone]

## 2018-09-29 NOTE — PROGRESS NOTE ADULT - ASSESSMENT
61 y/o fm s/p left total hip arthroplasty POD#1, physical therapy, incentive spirometer  Nimisha Garzon PA-C  Orthopaedic Surgery  Team pager [phone]  [facility] [phone]  [phone]

## 2018-09-29 NOTE — DISCHARGE NOTE ADULT - MEDICATION SUMMARY - MEDICATIONS TO TAKE
I will START or STAY ON the medications listed below when I get home from the hospital:    acetaminophen 325 mg oral tablet  -- 3 tab(s) by mouth every 8 hours, as needed for H/A, fever, mild pain  -- Indication: For Fever, H/A, mild pain    Motrin 600 mg oral tablet  -- 1 tab(s) by mouth every 6 hours as needed for moderate pain  -- Indication: For moderate pain    aspirin 81 mg oral delayed release tablet  -- 1 tab(s) by mouth once a day (at bedtime) for 1 month post op MDD:1  -- Indication: For antiplatelet therapy stay on this medication for 4 weeks post op    traMADol 50 mg oral tablet  -- 1 tab(s) by mouth every 6 hours, As needed, severe pain MDD:4  -- Indication: For Severe pain    PriLOSEC 10 mg oral delayed release capsule  -- 1 cap(s) by mouth once a day  -- Indication: For gastrointestinal agent    levothyroxine 50 mcg (0.05 mg) oral tablet  -- 1 tab(s) by mouth once a day  -- Indication: For Hormone replacement

## 2018-09-29 NOTE — DISCHARGE NOTE ADULT - PLAN OF CARE
surgical intervention should result in improved function continue weight bearing as tolerated ambulation/physical therapy with rolling walker.  maintain anterior total hip  precautions

## 2018-09-29 NOTE — DISCHARGE NOTE ADULT - PATIENT PORTAL LINK FT
You can access the [facility] Patient Portal, offered by [facility], by registering with the following website: [email or web address]

## 2018-09-29 NOTE — PROGRESS NOTE ADULT - SUBJECTIVE AND OBJECTIVE BOX
Patient is a 62y old  Female who presents with a chief complaint of Left hip pain  Patient s/p left total hip arthroplasty Anterior approach  POST OPERATIVE DAY #:  [1 ]   Patient comfortable  No complaints    T(C): 36.9 (09-29-18 @ 04:56), Max: 36.9 (09-29-18 @ 00:01)  HR: 83 (09-29-18 @ 04:56) (57 - 94)  BP: 105/70 (09-29-18 @ 04:56) (105/57 - 154/86)  RR: 18 (09-29-18 @ 04:56) (14 - 20)  SpO2: 97% (09-29-18 @ 04:56) (97% - 100%)    PHYSICAL EXAM:  NAD, Alert  [Left ] Hip: Aquacel dressing  C/D/I; sensation grossly intact to light touch; (+) DF/PF; (+) Distal Pulses; No Calf tenderness B/L, PAS

## 2018-09-29 NOTE — OCCUPATIONAL THERAPY INITIAL EVALUATION ADULT - LIVES WITH, PROFILE
spouse/Pt lives in a split level home. +Tub with curtain and grab bars, and stall showers. Owns hip kit and straight cane

## 2018-09-29 NOTE — DISCHARGE NOTE ADULT - NS AS ACTIVITY OBS
Walking-Indoors allowed/Walking-Outdoors allowed/Stairs allowed/continue weight bearing as tolerated ambulation/physical therapy with rolling walker.  maintain anterior total hip  precautions/No Heavy lifting/straining/Do not make important decisions/Do not drive or operate machinery

## 2018-09-29 NOTE — DISCHARGE NOTE ADULT - ADDITIONAL INSTRUCTIONS
Keep surgical incision/Aquacel dressing clean and dry, follow up with Dr Becerril post operative day #14 (10/12/18) for wound check and dressing removal. Continue weight bearing as tolerated ambulation/physical therapy with rolling walker.  maintain anterior total hip  precautions. Follow up with your primary care provider in 1-2 weeks

## 2018-09-29 NOTE — DISCHARGE NOTE ADULT - FINDINGS/TREATMENT
continue weight bearing as tolerated ambulation/physical therapy with rolling walker.  maintain anterior total hip  precautions

## 2018-09-29 NOTE — DISCHARGE NOTE ADULT - HOSPITAL COURSE
Reason for Admission	Left total hip replacement	     History of Present Illness:  History of Present Illness		  Mrs. Pemberton is a 62 year old woman with PMH former smoker, hypothyroidism and osteoarthritis s/p R THR who has had worsening pain in left hip and increased difficulty walking and is now scheduled for L THR.    Allergies/Medications:   Allergies:        Allergies:  	penicillins: Drug Category, Rash, Pruritus, Hives    Home Medications:   * Patient Currently Takes Medications as of 17-Sep-2018 15:23 documented in Structured Notes  · 	levothyroxine 50 mcg (0.05 mg) oral tablet: Last Dose Taken:  , 1 tab(s) orally once a day    PMH/PSH/FH/SH:    Past Medical History:  Former smoker, stopped smoking many years ago  Quit 1992  Hypothyroidism, unspecified type    Primary osteoarthritis involving multiple joints.     Past Surgical History:  H/O cosmetic surgery  Nose 2000  H/O hemorrhoidectomy  2000  H/O: hysterectomy  age 49  History of hip replacement, total, right  2004  S/P laparoscopic cholecystectomy  4/2018.    Hospital Course:  63 y/o FM underwent Left total hip arthroplasty (anterior approach) on 9/28/18 with .  Patient tolerated procedure well.  Patient was evaluated postoperatively by physical and occupational therapists for weight bearing as tolerated ambulation with rolling walker and cleared patient for discharge home with home physical therapy.  Patient advised to keep surgical incision/dressing clean and dry, and follow up with Dr Becerril post op day #14 (10/12/18).

## 2018-10-01 ENCOUNTER — CHART COPY (OUTPATIENT)
Age: 63
End: 2018-10-01

## 2018-10-02 ENCOUNTER — INBOUND DOCUMENT (OUTPATIENT)
Age: 63
End: 2018-10-02

## 2018-10-02 PROBLEM — E03.9 HYPOTHYROIDISM, UNSPECIFIED: Chronic | Status: ACTIVE | Noted: 2018-09-17

## 2018-10-10 ENCOUNTER — APPOINTMENT (OUTPATIENT)
Dept: ORTHOPEDIC SURGERY | Facility: CLINIC | Age: 63
End: 2018-10-10
Payer: COMMERCIAL

## 2018-10-10 PROCEDURE — 99024 POSTOP FOLLOW-UP VISIT: CPT

## 2018-10-10 PROCEDURE — 73502 X-RAY EXAM HIP UNI 2-3 VIEWS: CPT | Mod: LT

## 2018-10-11 ENCOUNTER — CHART COPY (OUTPATIENT)
Age: 63
End: 2018-10-11

## 2018-10-12 ENCOUNTER — CHART COPY (OUTPATIENT)
Age: 63
End: 2018-10-12

## 2018-10-22 ENCOUNTER — APPOINTMENT (OUTPATIENT)
Dept: ORTHOPEDIC SURGERY | Facility: CLINIC | Age: 63
End: 2018-10-22
Payer: COMMERCIAL

## 2018-10-22 PROCEDURE — 73502 X-RAY EXAM HIP UNI 2-3 VIEWS: CPT | Mod: LT

## 2018-10-22 PROCEDURE — 99024 POSTOP FOLLOW-UP VISIT: CPT

## 2018-12-10 ENCOUNTER — APPOINTMENT (OUTPATIENT)
Dept: ORTHOPEDIC SURGERY | Facility: CLINIC | Age: 63
End: 2018-12-10
Payer: COMMERCIAL

## 2018-12-10 PROCEDURE — 99024 POSTOP FOLLOW-UP VISIT: CPT

## 2018-12-27 ENCOUNTER — FORM ENCOUNTER (OUTPATIENT)
Age: 63
End: 2018-12-27

## 2019-01-02 ENCOUNTER — RX RENEWAL (OUTPATIENT)
Age: 64
End: 2019-01-02

## 2019-03-20 ENCOUNTER — APPOINTMENT (OUTPATIENT)
Dept: ORTHOPEDIC SURGERY | Facility: CLINIC | Age: 64
End: 2019-03-20
Payer: COMMERCIAL

## 2019-03-20 VITALS — WEIGHT: 195 LBS | HEIGHT: 63 IN | BODY MASS INDEX: 34.55 KG/M2

## 2019-03-20 DIAGNOSIS — M79.652 PAIN IN LEFT THIGH: ICD-10-CM

## 2019-03-20 PROCEDURE — 73502 X-RAY EXAM HIP UNI 2-3 VIEWS: CPT | Mod: LT

## 2019-03-20 PROCEDURE — 99214 OFFICE O/P EST MOD 30 MIN: CPT

## 2019-03-20 RX ORDER — IBUPROFEN 600 MG/1
600 TABLET, FILM COATED ORAL
Qty: 90 | Refills: 0 | Status: ACTIVE | COMMUNITY
Start: 2019-03-20 | End: 1900-01-01

## 2019-03-20 NOTE — PHYSICAL EXAM
[de-identified] : Patient is well nourished, well-developed, in no acute distress, with appropriate mood and affect. The patient is oriented to time, place, and person. Respirations are even and unlabored. Gait evaluation does not reveal a limp. There is no inguinal adenopathy. Examination of the contralateral hip shows normal range of motion, strength, no tenderness, and intact skin. The affected limb is well-perfused and showed 2+ dp/pt pulses, without skin lesions, shows a grossly normal motor and sensory examination. Examination of the hip shows a well-healed surgical scar. Hip motion is full and painless from 0-90 degrees extension to flexion, 20 degrees adduction and 20 degrees abduction, and 15 degrees internal and 30 degrees external rotation. Leg lengths are approximately equal. FADIR is negative and RODRIGUEZ is negative. Stinchfield test is negative. Both hips are stable and muscle strength is normal with good strength with resisted abduction and adduction. Pedal pulses are palpable.\par  [de-identified] : AP pelvis, AP hip, and lateral x-rays of the left hip were reviewed. Satisfactory position and alignment of the components are present. No signs of loosening are seen.\par

## 2019-03-20 NOTE — DISCUSSION/SUMMARY
[de-identified] : 6 months status post left total hip arthroplasty doing very well. Her thigh pain seems to be secondary to muscular flare from her overdoing it with her exercises the day prior to the pain starting. Her total hip arthroplasty functioning well. Prescribed a course of Motrin and recommended rest and a home exercise program. Followup at her one year anniversary for her total hip arthroplasty.

## 2019-03-20 NOTE — REASON FOR VISIT
[Follow-Up Visit] : a follow-up visit for [Artificial Hip Joint] : artificial hip joint [Spouse] : spouse

## 2019-03-20 NOTE — HISTORY OF PRESENT ILLNESS
[de-identified] : This is a very pleasant 65-year-old female who is now approximately 6 months status post left total hip arthroplasty. She recovered extremely well from the surgery. She suddenly developed a flare of pain in the left thigh after doing a significant amount of heavy lifting the day prior. She denies groin pain. She denies fevers or chills. She denies recent infection. Her activities of daily living are not impaired. Her ambulation distance is not impaired. She does not use a cane or walker for assistance. The pain has been improving over the past day already. She does have a known history of spinal stenosis. She denies any bowel or bladder incontinence. She denied numbness or tingling or weakness in the leg. She has not had physical therapy for this. She is not taking NSAIDs.

## 2019-04-10 ENCOUNTER — RX RENEWAL (OUTPATIENT)
Age: 64
End: 2019-04-10

## 2019-07-12 ENCOUNTER — RX RENEWAL (OUTPATIENT)
Age: 64
End: 2019-07-12

## 2019-07-12 RX ORDER — IBUPROFEN 600 MG/1
600 TABLET, FILM COATED ORAL 3 TIMES DAILY
Qty: 30 | Refills: 0 | Status: ACTIVE | COMMUNITY
Start: 2019-07-12 | End: 1900-01-01

## 2019-08-15 ENCOUNTER — RX RENEWAL (OUTPATIENT)
Age: 64
End: 2019-08-15

## 2019-08-15 RX ORDER — IBUPROFEN 600 MG/1
600 TABLET, FILM COATED ORAL
Qty: 90 | Refills: 0 | Status: ACTIVE | COMMUNITY
Start: 2018-07-09 | End: 1900-01-01

## 2019-10-29 ENCOUNTER — APPOINTMENT (OUTPATIENT)
Dept: ULTRASOUND IMAGING | Facility: CLINIC | Age: 64
End: 2019-10-29
Payer: COMMERCIAL

## 2019-10-29 ENCOUNTER — APPOINTMENT (OUTPATIENT)
Dept: ORTHOPEDIC SURGERY | Facility: CLINIC | Age: 64
End: 2019-10-29
Payer: COMMERCIAL

## 2019-10-29 ENCOUNTER — OUTPATIENT (OUTPATIENT)
Dept: OUTPATIENT SERVICES | Facility: HOSPITAL | Age: 64
LOS: 1 days | End: 2019-10-29
Payer: COMMERCIAL

## 2019-10-29 DIAGNOSIS — Z96.649 PAIN DUE TO INTERNAL ORTHOPEDIC PROSTHETIC DEVICES, IMPLANTS AND GRAFTS, INITIAL ENCOUNTER: ICD-10-CM

## 2019-10-29 DIAGNOSIS — Z96.641 PRESENCE OF RIGHT ARTIFICIAL HIP JOINT: Chronic | ICD-10-CM

## 2019-10-29 DIAGNOSIS — Z96.642 PRESENCE OF LEFT ARTIFICIAL HIP JOINT: ICD-10-CM

## 2019-10-29 DIAGNOSIS — Z96.641 PRESENCE OF RIGHT ARTIFICIAL HIP JOINT: ICD-10-CM

## 2019-10-29 DIAGNOSIS — Z98.890 OTHER SPECIFIED POSTPROCEDURAL STATES: Chronic | ICD-10-CM

## 2019-10-29 DIAGNOSIS — Z90.710 ACQUIRED ABSENCE OF BOTH CERVIX AND UTERUS: Chronic | ICD-10-CM

## 2019-10-29 DIAGNOSIS — T84.84XA PAIN DUE TO INTERNAL ORTHOPEDIC PROSTHETIC DEVICES, IMPLANTS AND GRAFTS, INITIAL ENCOUNTER: ICD-10-CM

## 2019-10-29 DIAGNOSIS — Z90.49 ACQUIRED ABSENCE OF OTHER SPECIFIED PARTS OF DIGESTIVE TRACT: Chronic | ICD-10-CM

## 2019-10-29 DIAGNOSIS — Z00.8 ENCOUNTER FOR OTHER GENERAL EXAMINATION: ICD-10-CM

## 2019-10-29 PROCEDURE — 76882 US LMTD JT/FCL EVL NVASC XTR: CPT

## 2019-10-29 PROCEDURE — 73522 X-RAY EXAM HIPS BI 3-4 VIEWS: CPT

## 2019-10-29 PROCEDURE — 99214 OFFICE O/P EST MOD 30 MIN: CPT

## 2019-10-29 PROCEDURE — 76882 US LMTD JT/FCL EVL NVASC XTR: CPT | Mod: 26

## 2019-10-29 NOTE — HISTORY OF PRESENT ILLNESS
[de-identified] : This is very nice 64 year old woman s/p L HOLA 9/28/18 by me and a prior posterior approach R HOLA 12 years ago by another surgeon, who is experiencing B/L hip pain which started 6-7 months ago, Lt>Rt, severe in intensity.  Different pain than before surgery.  She states it goes into the groin and radiates down to her knees.  She has seen the physiatrist as she has a history of lumbar spine herniated disks recommended at last visit who did not feel this was from her back.  She states that she had an EMG which was normal.  These records are not available for review today.  The pain is exacerbated by walking or standing from sitting.  Medications she has tried include: Motrin 600mg which helps significantly.  She has tried physical therapy with no relief.  She has been using a cane. She has not had a prior injection into the joint but has had a cortisone inj for bursitis many years ago.   She denies numbness and tingling in the extremity.  The pain substantially limits activities of daily living. Walking tolerance is reduced.  She denies fever/chills.  She states that she had done MRIs in the past but they were inconclusive due to the metal artifact.  She states that she has been having stiffness throughout the entire body and has seen a rheumatologist in the past which has had a negative work-up.  She also has pains in multiple joints of the upper and lower extremities.\par \par

## 2019-10-29 NOTE — PHYSICAL EXAM
[de-identified] : Patient is well nourished, well-developed, in no acute distress, with appropriate mood and affect. The patient is oriented to time, place, and person. Respirations are even and unlabored. Gait evaluation does not reveal a limp. There is no inguinal adenopathy. \par The right limb is well-perfused and showed 2+ dp/pt pulses, without skin lesions, shows a grossly normal motor and sensory examination. Examination of the hip shows a well-healed surgical scar. Hip motion is full and painless from 0-90 degrees extension to flexion, 20 degrees adduction and 20 degrees abduction, and 15 degrees internal and 30 degrees external rotation. Stinchfield test is negative. FADIR test is mildly positive. RODRIGUEZ test is negative. The left limb is well-perfused and showed 2+ dp/pt pulses, without skin lesions, shows a grossly normal motor and sensory examination. Examination of the hip shows a well-healed surgical scar. Hip motion is full and painless from 0-90 degrees extension to flexion, 20 degrees adduction and 20 degrees abduction, and 15 degrees internal and 30 degrees external rotation. Stinchfield test is negative.  FADIR test is negative. RODRIGUEZ test is negative. Leg lengths are approximately equal. Both hips are stable and muscle strength is normal with good strength with resisted abduction and adduction. Pedal pulses are palpable. [de-identified] : AP pelvis, AP hip, and lateral x-rays of the bilateral hip were reviewed. Satisfactory position and alignment of the components are present. No signs of loosening are seen.

## 2019-10-29 NOTE — DISCUSSION/SUMMARY
[de-identified] : This patient has bilateral hip osteoarthritis.  She has relatively benign hip exams although with mild pain on right hip internal rotation.  Given that she is having pain throughout the body and stiffness I recommend that she seek follow-up care by rheumatology for work-up of fibromyalgia or PMR.  Her left hip joint does not appear to be the source of her problem.  She will also seek care from acupuncture.  I suggested metal-on-metal testing for the right hip as follow-up but she does not want to do this today.  Follow-up with me in 3 years for the left total hip arthroplasty.

## 2019-11-20 ENCOUNTER — APPOINTMENT (OUTPATIENT)
Dept: ORTHOPEDIC SURGERY | Facility: CLINIC | Age: 64
End: 2019-11-20

## 2020-02-24 DIAGNOSIS — M25.552 PAIN IN LEFT HIP: ICD-10-CM

## 2020-09-10 NOTE — H&P PST ADULT - GASTROINTESTINAL DETAILS
Include Location In Plan?: Yes Hide Include Location In Plan Question?: No Detail Level: Zone Additional Note: LN2 bowel sounds normal/soft/nontender/no distention

## 2021-06-01 ENCOUNTER — APPOINTMENT (OUTPATIENT)
Dept: ORTHOPEDIC SURGERY | Facility: CLINIC | Age: 66
End: 2021-06-01

## 2022-06-01 NOTE — H&P PST ADULT - RESPIRATORY RATE (BREATHS/MIN)
14 Winlevi Pregnancy And Lactation Text: This medication is considered safe during pregnancy and breastfeeding.

## 2022-08-29 ENCOUNTER — EMERGENCY (EMERGENCY)
Facility: HOSPITAL | Age: 67
LOS: 1 days | Discharge: HOME | End: 2022-08-29
Attending: EMERGENCY MEDICINE | Admitting: EMERGENCY MEDICINE
Payer: COMMERCIAL

## 2022-08-29 VITALS
OXYGEN SATURATION: 99 % | HEART RATE: 68 BPM | HEIGHT: 63 IN | TEMPERATURE: 98 F | SYSTOLIC BLOOD PRESSURE: 163 MMHG | WEIGHT: 169.98 LBS | DIASTOLIC BLOOD PRESSURE: 98 MMHG | RESPIRATION RATE: 18 BRPM

## 2022-08-29 DIAGNOSIS — Z90.710 ACQUIRED ABSENCE OF BOTH CERVIX AND UTERUS: Chronic | ICD-10-CM

## 2022-08-29 DIAGNOSIS — Z98.890 OTHER SPECIFIED POSTPROCEDURAL STATES: Chronic | ICD-10-CM

## 2022-08-29 DIAGNOSIS — Z90.49 ACQUIRED ABSENCE OF OTHER SPECIFIED PARTS OF DIGESTIVE TRACT: Chronic | ICD-10-CM

## 2022-08-29 DIAGNOSIS — Z96.641 PRESENCE OF RIGHT ARTIFICIAL HIP JOINT: Chronic | ICD-10-CM

## 2022-08-29 PROCEDURE — 99283 EMERGENCY DEPT VISIT LOW MDM: CPT

## 2022-08-29 RX ORDER — IBUPROFEN 200 MG
600 TABLET ORAL ONCE
Refills: 0 | Status: COMPLETED | OUTPATIENT
Start: 2022-08-29 | End: 2022-08-29

## 2022-08-29 RX ADMIN — Medication 600 MILLIGRAM(S): at 21:50

## 2022-08-29 NOTE — ED PROVIDER NOTE - NSICDXPASTSURGICALHX_GEN_ALL_CORE_FT
PAST SURGICAL HISTORY:  H/O cosmetic surgery Nose 2000    H/O hemorrhoidectomy 2000    H/O: hysterectomy age 49    History of hip replacement, total, right 2004    S/P laparoscopic cholecystectomy 4/2018

## 2022-08-29 NOTE — ED PROVIDER NOTE - NSICDXPASTMEDICALHX_GEN_ALL_CORE_FT
PAST MEDICAL HISTORY:  Former smoker, stopped smoking many years ago Quit 1992    Hypothyroidism, unspecified type     Primary osteoarthritis involving multiple joints

## 2022-08-29 NOTE — ED PROVIDER NOTE - PATIENT PORTAL LINK FT
You can access the FollowMyHealth Patient Portal offered by [facility] by registering at the following website: [email or web address]. By joining [employer]s FollowMyHealth portal, you will also be able to view your health information using other applications (apps) compatible with our system.
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
## 2022-08-29 NOTE — ED PROVIDER NOTE - NSFOLLOWUPINSTRUCTIONS_ED_ALL_ED_FT
Musculoskeletal Pain    WHAT YOU NEED TO KNOW:    Musculoskeletal pain can occur in muscles, bones, ligaments, tendons, or nerves. The pain can be dull, achy, or sharp. You may have pain and tenderness to the touch as well. The pain can occur anywhere in your body. Musculoskeletal pain can be from an injury, or a medical condition such as polymyositis.    DISCHARGE INSTRUCTIONS:    Return to the emergency department if:   •You have severe pain when you move the area.      •You lose feeling in the area.      •You have new or worse pain or swelling in the area. Your skin may feel tight.      Call your doctor or pain specialist if:   •You have a fever.      •You have pain that does not get better with treatment.      •You have trouble sleeping because of your pain.      •Your painful area becomes more tender, red, and warm to the touch.      •You have less movement of the painful area.      •You have questions or concerns about your condition or care.      Self-care:   •Rest as directed. Avoid activity that causes pain. You may be able to return to normal activity when you can move without pain. Follow directions for rest and activity. You are at risk for injury for 3 weeks after your symptoms go away.      •Ice the painful area to decrease pain and swelling. Use an ice pack, or put ice in a plastic bag and cover it with a towel. Always put a cloth between the ice and your skin. Apply the ice as often as directed for the first 24 to 48 hours.      •Apply compression to the area, if directed. Your healthcare provider may want you to use a splint, brace, or elastic bandage. Compression helps decrease pain and swelling in an arm or leg. A splint, brace, or bandage will also help protect the painful area when you move around.  How to Wrap an Elastic Bandage           •Elevate a painful arm or leg to reduce swelling and pain. Elevate your limb while you are sitting or lying. Prop a painful leg on pillows to keep it above the level of your heart.         Elevate Leg           Medicines: You may need any of the following:  •NSAIDs help decrease swelling and pain or fever. This medicine is available with or without a doctor's order. NSAIDs can cause stomach bleeding or kidney problems in certain people. If you take blood thinner medicine, always ask your healthcare provider if NSAIDs are safe for you. Always read the medicine label and follow directions.      •Acetaminophen decreases pain and fever. It is available without a doctor's order. Ask how much to take and how often to take it. Follow directions. Read the labels of all other medicines you are using to see if they also contain acetaminophen, or ask your doctor or pharmacist. Acetaminophen can cause liver damage if not taken correctly.      •Muscle relaxers help relax your muscles to decrease pain and muscle spasms.      •Steroids may be given to decrease redness, pain, and swelling.      •Take your medicine as directed. Contact your healthcare provider if you think your medicine is not helping or if you have side effects. Tell your provider if you are allergic to any medicine. Keep a list of the medicines, vitamins, and herbs you take. Include the amounts, and when and why you take them. Bring the list or the pill bottles to follow-up visits. Carry your medicine list with you in case of an emergency.      Follow up with your doctor or pain specialist as directed: You may need more tests to help healthcare providers find the cause of your muscle pain. You may need physical therapy to learn muscle strengthening exercises. Write down your questions so you remember to ask them during your visits.

## 2022-10-19 NOTE — OCCUPATIONAL THERAPY INITIAL EVALUATION ADULT - ADL RETRAINING, OT EVAL
[Well Developed] : well developed [No Acute Distress] : no acute distress [Obese] : obese [Normal Conjunctiva] : normal conjunctiva [Normal Venous Pressure] : normal venous pressure [No Carotid Bruit] : no carotid bruit [Normal S1, S2] : normal S1, S2 [No Murmur] : no murmur [No Rub] : no rub [No Gallop] : no gallop [Clear Lung Fields] : clear lung fields [Good Air Entry] : good air entry [No Respiratory Distress] : no respiratory distress  [Soft] : abdomen soft [Non Tender] : non-tender [No Masses/organomegaly] : no masses/organomegaly [Normal Bowel Sounds] : normal bowel sounds [Normal Gait] : normal gait [No Edema] : no edema [No Cyanosis] : no cyanosis [No Clubbing] : no clubbing [No Varicosities] : no varicosities [No Rash] : no rash [No Skin Lesions] : no skin lesions [Moves all extremities] : moves all extremities [No Focal Deficits] : no focal deficits [Normal Speech] : normal speech [Alert and Oriented] : alert and oriented [Normal memory] : normal memory Patient will dress lower body independently, AE as needed in 2 weeks

## 2023-11-03 ENCOUNTER — RESULT REVIEW (OUTPATIENT)
Age: 68
End: 2023-11-03

## 2023-11-03 ENCOUNTER — APPOINTMENT (OUTPATIENT)
Dept: ORTHOPEDIC SURGERY | Facility: CLINIC | Age: 68
End: 2023-11-03
Payer: MEDICARE

## 2023-11-03 VITALS
WEIGHT: 175 LBS | BODY MASS INDEX: 32.2 KG/M2 | HEART RATE: 82 BPM | SYSTOLIC BLOOD PRESSURE: 120 MMHG | DIASTOLIC BLOOD PRESSURE: 83 MMHG | HEIGHT: 62 IN

## 2023-11-03 DIAGNOSIS — M54.9 DORSALGIA, UNSPECIFIED: ICD-10-CM

## 2023-11-03 DIAGNOSIS — M25.551 PAIN IN RIGHT HIP: ICD-10-CM

## 2023-11-03 DIAGNOSIS — M25.552 PAIN IN RIGHT HIP: ICD-10-CM

## 2023-11-03 PROCEDURE — 73522 X-RAY EXAM HIPS BI 3-4 VIEWS: CPT

## 2023-11-03 PROCEDURE — 72110 X-RAY EXAM L-2 SPINE 4/>VWS: CPT

## 2023-11-03 PROCEDURE — 99204 OFFICE O/P NEW MOD 45 MIN: CPT

## 2023-11-07 ENCOUNTER — RESULT REVIEW (OUTPATIENT)
Age: 68
End: 2023-11-07

## 2023-11-07 ENCOUNTER — APPOINTMENT (OUTPATIENT)
Dept: CT IMAGING | Facility: CLINIC | Age: 68
End: 2023-11-07
Payer: MEDICARE

## 2023-11-07 PROCEDURE — 72192 CT PELVIS W/O DYE: CPT

## 2023-11-07 PROCEDURE — 76376 3D RENDER W/INTRP POSTPROCES: CPT

## 2023-11-12 ENCOUNTER — TRANSCRIPTION ENCOUNTER (OUTPATIENT)
Age: 68
End: 2023-11-12

## 2023-11-15 ENCOUNTER — APPOINTMENT (OUTPATIENT)
Dept: ULTRASOUND IMAGING | Facility: CLINIC | Age: 68
End: 2023-11-15

## 2023-11-21 ENCOUNTER — APPOINTMENT (OUTPATIENT)
Dept: ORTHOPEDIC SURGERY | Facility: CLINIC | Age: 68
End: 2023-11-21
Payer: MEDICARE

## 2023-11-21 ENCOUNTER — NON-APPOINTMENT (OUTPATIENT)
Age: 68
End: 2023-11-21

## 2023-11-21 VITALS — HEART RATE: 76 BPM | SYSTOLIC BLOOD PRESSURE: 136 MMHG | DIASTOLIC BLOOD PRESSURE: 89 MMHG

## 2023-11-21 DIAGNOSIS — T84.038A MECHANICAL LOOSENING OF OTHER INTERNAL PROSTHETIC JOINT, INITIAL ENCOUNTER: ICD-10-CM

## 2023-11-21 DIAGNOSIS — Z96.649 MECHANICAL LOOSENING OF OTHER INTERNAL PROSTHETIC JOINT, INITIAL ENCOUNTER: ICD-10-CM

## 2023-11-21 DIAGNOSIS — T56.94XA: ICD-10-CM

## 2023-11-21 LAB
COBALT: 5.5 UG/L
CR BLD-MCNC: 5.8 UG/L
CRP SERPL-MCNC: 3 MG/L
ERYTHROCYTE [SEDIMENTATION RATE] IN BLOOD BY WESTERGREN METHOD: 42 MM/HR
HCT VFR BLD CALC: 40.7 %
HGB BLD-MCNC: 13.5 G/DL
MCHC RBC-ENTMCNC: 31.2 PG
MCHC RBC-ENTMCNC: 33.2 GM/DL
MCV RBC AUTO: 94 FL
NICKEL: 2.6 UG/L
PLATELET # BLD AUTO: 266 K/UL
RBC # BLD: 4.33 M/UL
RBC # FLD: 13.4 %
WBC # FLD AUTO: 6.93 K/UL

## 2023-11-21 PROCEDURE — 99215 OFFICE O/P EST HI 40 MIN: CPT

## 2023-12-06 ENCOUNTER — OUTPATIENT (OUTPATIENT)
Dept: OUTPATIENT SERVICES | Facility: HOSPITAL | Age: 68
LOS: 1 days | End: 2023-12-06
Payer: MEDICARE

## 2023-12-06 ENCOUNTER — APPOINTMENT (OUTPATIENT)
Dept: ULTRASOUND IMAGING | Facility: CLINIC | Age: 68
End: 2023-12-06
Payer: MEDICARE

## 2023-12-06 DIAGNOSIS — Z96.641 PRESENCE OF RIGHT ARTIFICIAL HIP JOINT: ICD-10-CM

## 2023-12-06 DIAGNOSIS — Z98.890 OTHER SPECIFIED POSTPROCEDURAL STATES: Chronic | ICD-10-CM

## 2023-12-06 DIAGNOSIS — T84.84XA PAIN DUE TO INTERNAL ORTHOPEDIC PROSTHETIC DEVICES, IMPLANTS AND GRAFTS, INITIAL ENCOUNTER: ICD-10-CM

## 2023-12-06 DIAGNOSIS — Z90.49 ACQUIRED ABSENCE OF OTHER SPECIFIED PARTS OF DIGESTIVE TRACT: Chronic | ICD-10-CM

## 2023-12-06 DIAGNOSIS — Z90.710 ACQUIRED ABSENCE OF BOTH CERVIX AND UTERUS: Chronic | ICD-10-CM

## 2023-12-06 DIAGNOSIS — Z96.641 PRESENCE OF RIGHT ARTIFICIAL HIP JOINT: Chronic | ICD-10-CM

## 2023-12-06 PROCEDURE — 20611 DRAIN/INJ JOINT/BURSA W/US: CPT | Mod: RT

## 2023-12-06 PROCEDURE — 20611 DRAIN/INJ JOINT/BURSA W/US: CPT

## 2024-02-20 ENCOUNTER — OUTPATIENT (OUTPATIENT)
Dept: OUTPATIENT SERVICES | Facility: HOSPITAL | Age: 69
LOS: 1 days | End: 2024-02-20
Payer: MEDICARE

## 2024-02-20 VITALS
TEMPERATURE: 98 F | HEART RATE: 70 BPM | WEIGHT: 179.9 LBS | DIASTOLIC BLOOD PRESSURE: 70 MMHG | HEIGHT: 62 IN | SYSTOLIC BLOOD PRESSURE: 116 MMHG | OXYGEN SATURATION: 99 % | RESPIRATION RATE: 14 BRPM

## 2024-02-20 DIAGNOSIS — T84.038A MECHANICAL LOOSENING OF OTHER INTERNAL PROSTHETIC JOINT, INITIAL ENCOUNTER: ICD-10-CM

## 2024-02-20 DIAGNOSIS — Z01.818 ENCOUNTER FOR OTHER PREPROCEDURAL EXAMINATION: ICD-10-CM

## 2024-02-20 DIAGNOSIS — Z96.641 PRESENCE OF RIGHT ARTIFICIAL HIP JOINT: Chronic | ICD-10-CM

## 2024-02-20 DIAGNOSIS — Z96.642 PRESENCE OF LEFT ARTIFICIAL HIP JOINT: Chronic | ICD-10-CM

## 2024-02-20 DIAGNOSIS — Z90.49 ACQUIRED ABSENCE OF OTHER SPECIFIED PARTS OF DIGESTIVE TRACT: Chronic | ICD-10-CM

## 2024-02-20 DIAGNOSIS — Z98.890 OTHER SPECIFIED POSTPROCEDURAL STATES: Chronic | ICD-10-CM

## 2024-02-20 DIAGNOSIS — Z90.710 ACQUIRED ABSENCE OF BOTH CERVIX AND UTERUS: Chronic | ICD-10-CM

## 2024-02-20 DIAGNOSIS — M16.11 UNILATERAL PRIMARY OSTEOARTHRITIS, RIGHT HIP: ICD-10-CM

## 2024-02-20 DIAGNOSIS — Z98.84 BARIATRIC SURGERY STATUS: Chronic | ICD-10-CM

## 2024-02-20 LAB
A1C WITH ESTIMATED AVERAGE GLUCOSE RESULT: 5 % — SIGNIFICANT CHANGE UP (ref 4–5.6)
ALBUMIN SERPL ELPH-MCNC: 3.6 G/DL — SIGNIFICANT CHANGE UP (ref 3.3–5)
ALP SERPL-CCNC: 101 U/L — SIGNIFICANT CHANGE UP (ref 30–120)
ALT FLD-CCNC: 18 U/L — SIGNIFICANT CHANGE UP (ref 10–60)
ANION GAP SERPL CALC-SCNC: 7 MMOL/L — SIGNIFICANT CHANGE UP (ref 5–17)
APTT BLD: 31.1 SEC — SIGNIFICANT CHANGE UP (ref 24.5–35.6)
AST SERPL-CCNC: 15 U/L — SIGNIFICANT CHANGE UP (ref 10–40)
BILIRUB SERPL-MCNC: 0.4 MG/DL — SIGNIFICANT CHANGE UP (ref 0.2–1.2)
BLD GP AB SCN SERPL QL: SIGNIFICANT CHANGE UP
BUN SERPL-MCNC: 21 MG/DL — SIGNIFICANT CHANGE UP (ref 7–23)
CALCIUM SERPL-MCNC: 9.7 MG/DL — SIGNIFICANT CHANGE UP (ref 8.4–10.5)
CHLORIDE SERPL-SCNC: 108 MMOL/L — SIGNIFICANT CHANGE UP (ref 96–108)
CO2 SERPL-SCNC: 27 MMOL/L — SIGNIFICANT CHANGE UP (ref 22–31)
CREAT SERPL-MCNC: 0.88 MG/DL — SIGNIFICANT CHANGE UP (ref 0.5–1.3)
EGFR: 72 ML/MIN/1.73M2 — SIGNIFICANT CHANGE UP
ESTIMATED AVERAGE GLUCOSE: 97 MG/DL — SIGNIFICANT CHANGE UP (ref 68–114)
GLUCOSE SERPL-MCNC: 87 MG/DL — SIGNIFICANT CHANGE UP (ref 70–99)
HCT VFR BLD CALC: 39.4 % — SIGNIFICANT CHANGE UP (ref 34.5–45)
HGB BLD-MCNC: 12.5 G/DL — SIGNIFICANT CHANGE UP (ref 11.5–15.5)
INR BLD: 1.05 RATIO — SIGNIFICANT CHANGE UP (ref 0.85–1.18)
MCHC RBC-ENTMCNC: 29.7 PG — SIGNIFICANT CHANGE UP (ref 27–34)
MCHC RBC-ENTMCNC: 31.7 GM/DL — LOW (ref 32–36)
MCV RBC AUTO: 93.6 FL — SIGNIFICANT CHANGE UP (ref 80–100)
MRSA PCR RESULT.: SIGNIFICANT CHANGE UP
NRBC # BLD: 0 /100 WBCS — SIGNIFICANT CHANGE UP (ref 0–0)
PLATELET # BLD AUTO: 243 K/UL — SIGNIFICANT CHANGE UP (ref 150–400)
POTASSIUM SERPL-MCNC: 5.2 MMOL/L — SIGNIFICANT CHANGE UP (ref 3.5–5.3)
POTASSIUM SERPL-SCNC: 5.2 MMOL/L — SIGNIFICANT CHANGE UP (ref 3.5–5.3)
PROT SERPL-MCNC: 7 G/DL — SIGNIFICANT CHANGE UP (ref 6–8.3)
PROTHROM AB SERPL-ACNC: 11.4 SEC — SIGNIFICANT CHANGE UP (ref 9.5–13)
RBC # BLD: 4.21 M/UL — SIGNIFICANT CHANGE UP (ref 3.8–5.2)
RBC # FLD: 12.6 % — SIGNIFICANT CHANGE UP (ref 10.3–14.5)
S AUREUS DNA NOSE QL NAA+PROBE: SIGNIFICANT CHANGE UP
SODIUM SERPL-SCNC: 142 MMOL/L — SIGNIFICANT CHANGE UP (ref 135–145)
WBC # BLD: 5.5 K/UL — SIGNIFICANT CHANGE UP (ref 3.8–10.5)
WBC # FLD AUTO: 5.5 K/UL — SIGNIFICANT CHANGE UP (ref 3.8–10.5)

## 2024-02-20 PROCEDURE — 93010 ELECTROCARDIOGRAM REPORT: CPT

## 2024-02-20 PROCEDURE — G0463: CPT

## 2024-02-20 PROCEDURE — 93005 ELECTROCARDIOGRAM TRACING: CPT

## 2024-02-20 RX ORDER — LEVOTHYROXINE SODIUM 125 MCG
1 TABLET ORAL
Qty: 0 | Refills: 0 | DISCHARGE

## 2024-02-20 RX ORDER — OMEPRAZOLE 10 MG/1
1 CAPSULE, DELAYED RELEASE ORAL
Qty: 0 | Refills: 0 | DISCHARGE

## 2024-02-20 RX ORDER — IBUPROFEN 200 MG
1 TABLET ORAL
Qty: 0 | Refills: 0 | DISCHARGE

## 2024-02-20 NOTE — H&P PST ADULT - NSICDXPASTSURGICALHX_GEN_ALL_CORE_FT
PAST SURGICAL HISTORY:  H/O cosmetic surgery Nose 2000    H/O hemorrhoidectomy 2000    H/O: hysterectomy age 49    History of hip replacement, total, right 2004    S/P hip replacement, left     S/P laparoscopic cholecystectomy 4/2018    Status post laparoscopic sleeve gastrectomy

## 2024-02-20 NOTE — H&P PST ADULT - ALLERGY TYPES
raw almonds, tree nuts, fresh fruit but not citrus/outdoor environmental allergies/indoor environmental allergies/reactions to medicines/reactions to food

## 2024-02-20 NOTE — H&P PST ADULT - PROBLEM SELECTOR PLAN 1
scheduled for right hip replacement on 3/4/34  will obtain medical clearance   pre op instructions on wash and medications  instructed to ana maría synthroid on DOS

## 2024-02-20 NOTE — H&P PST ADULT - HISTORY OF PRESENT ILLNESS
This is  69y/o female who had undergone right hip replacement 2008 presents with right hip pain for several years despite of surgery .she has experienced severe shocking pain , difficulty walking in the month November 2024,f/u Dr Quiroz , CT scan was done which showed loosening of the acetabulum and  blood work with an elevated ESR , cobalt and chromium . scheduled for right hip replacement on 3/4/24 This is  69y/o female who had undergone right hip replacement 2008 presents with right hip pain for several years despite of surgery .she had experienced severe shocking pain , difficulty walking in the month November 2024,f/u Dr Quiroz , CT scan was done which showed loosening of the acetabulum and  blood work with an elevated ESR , cobalt and chromium .Reports constant pain and increased pain when standing , walking  scheduled for right hip replacement on 3/4/24

## 2024-02-20 NOTE — H&P PST ADULT - ATTENDING COMMENTS
The discussion regarding options for nonoperative and operative management was introduced through a patient shared decision making protocol. The benefits of surgery versus the risks were discussed with the patient and family.  Risks of surgery include medical complications of anesthesia, DVT, pulmonary embolism, heart attack, stroke, death, hardware complications, need for revision surgery, infection, bleeding, need for transfusion, neurovascular injury including thigh numbness, dislocation, fracture, chronic pain, stiffness and scarring including heterotopic ossification, and limb length discrepancy requiring a shoe lift were addressed with the patient. They appeared to understand the ramifications of surgery and had ample time for questions, then consenting for a right revision total hip replacement.

## 2024-02-20 NOTE — H&P PST ADULT - NEGATIVE NEUROLOGICAL SYMPTOMS
no paresthesias/no generalized seizures/no focal seizures/no syncope/no tremors/no vertigo/no headache

## 2024-02-20 NOTE — H&P PST ADULT - ASSESSMENT
CAPRINI SCORE [CLOT]    AGE RELATED RISK FACTORS                                                       MOBILITY RELATED FACTORS  [ ] Age 41-60 years                                            (1 Point)                  [ ] Bed rest                                                        (1 Point)  [x ] Age: 61-74 years                                           (2 Points)                 [ ] Plaster cast                                                   (2 Points)  [ ] Age= 75 years                                              (3 Points)                 [ ] Bed bound for more than 72 hours                 (2 Points)    DISEASE RELATED RISK FACTORS                                               GENDER SPECIFIC FACTORS  [ ] Edema in the lower extremities                       (1 Point)                  [ ] Pregnancy                                                     (1 Point)     69 y/o female with right hip pain

## 2024-02-20 NOTE — H&P PST ADULT - NSICDXPASTMEDICALHX_GEN_ALL_CORE_FT
PAST MEDICAL HISTORY:  Hypothyroidism, unspecified type     Metallosis     Osteoarthritis of right hip

## 2024-02-20 NOTE — H&P PST ADULT - ANESTHESIA, PREVIOUS REACTION, PROFILE
===========================

Datetime: 02/12/2019 08:57

===========================

   

Discharged to, Provider:  Home

Follow up at, Provider:  Dr. Rico

Disch Instr Activity:  Normal activity

Disch Instr Diet:  Regular

Discharge Diet restrict Prov:  none

Discharge Instructions, Provider:  Routine instructions given

Discharge Time:  02/12/2019 09:30

Follow up in weeks, Provider:  02/13/2019

Disch Referrals:  None

Disch Activity Restrictions:  No lifting

Discharge Comment, Provider:  kayley kurtz

      

Discharge Diagnosis Prov Other:  gastritis
severe N &V and itchy/nausea/vomiting

## 2024-02-20 NOTE — H&P PST ADULT - NSICDXFAMILYHX_GEN_ALL_CORE_FT
FAMILY HISTORY:  Father  Still living? No  FH: renal cell carcinoma, Age at diagnosis: Age Unknown    Mother  Still living? No  Family history of bladder cancer, Age at diagnosis: Age Unknown    Sibling  Still living? Yes, Estimated age: 61-70  Family history of DVT, Age at diagnosis: Age Unknown  Family history of ulcerative colitis, Age at diagnosis: Age Unknown  FH: pulmonary embolism, Age at diagnosis: Age Unknown

## 2024-02-27 PROBLEM — T56.91XA: Chronic | Status: ACTIVE | Noted: 2024-02-20

## 2024-02-27 PROBLEM — M16.11 UNILATERAL PRIMARY OSTEOARTHRITIS, RIGHT HIP: Chronic | Status: ACTIVE | Noted: 2024-02-20

## 2024-03-03 ENCOUNTER — TRANSCRIPTION ENCOUNTER (OUTPATIENT)
Age: 69
End: 2024-03-03

## 2024-03-04 ENCOUNTER — RESULT REVIEW (OUTPATIENT)
Age: 69
End: 2024-03-04

## 2024-03-04 ENCOUNTER — INPATIENT (INPATIENT)
Facility: HOSPITAL | Age: 69
LOS: 1 days | Discharge: HOME | DRG: 467 | End: 2024-03-06
Attending: STUDENT IN AN ORGANIZED HEALTH CARE EDUCATION/TRAINING PROGRAM | Admitting: STUDENT IN AN ORGANIZED HEALTH CARE EDUCATION/TRAINING PROGRAM
Payer: MEDICARE

## 2024-03-04 ENCOUNTER — APPOINTMENT (OUTPATIENT)
Dept: ORTHOPEDIC SURGERY | Facility: HOSPITAL | Age: 69
End: 2024-03-04

## 2024-03-04 VITALS
HEIGHT: 62 IN | RESPIRATION RATE: 10 BRPM | TEMPERATURE: 97 F | DIASTOLIC BLOOD PRESSURE: 51 MMHG | WEIGHT: 175.93 LBS | HEART RATE: 86 BPM | OXYGEN SATURATION: 100 % | SYSTOLIC BLOOD PRESSURE: 126 MMHG

## 2024-03-04 DIAGNOSIS — Z96.641 PRESENCE OF RIGHT ARTIFICIAL HIP JOINT: Chronic | ICD-10-CM

## 2024-03-04 DIAGNOSIS — Z98.84 BARIATRIC SURGERY STATUS: Chronic | ICD-10-CM

## 2024-03-04 DIAGNOSIS — Z90.49 ACQUIRED ABSENCE OF OTHER SPECIFIED PARTS OF DIGESTIVE TRACT: Chronic | ICD-10-CM

## 2024-03-04 DIAGNOSIS — T84.038A MECHANICAL LOOSENING OF OTHER INTERNAL PROSTHETIC JOINT, INITIAL ENCOUNTER: ICD-10-CM

## 2024-03-04 DIAGNOSIS — Z01.818 ENCOUNTER FOR OTHER PREPROCEDURAL EXAMINATION: ICD-10-CM

## 2024-03-04 DIAGNOSIS — Z98.890 OTHER SPECIFIED POSTPROCEDURAL STATES: Chronic | ICD-10-CM

## 2024-03-04 DIAGNOSIS — Z96.642 PRESENCE OF LEFT ARTIFICIAL HIP JOINT: Chronic | ICD-10-CM

## 2024-03-04 DIAGNOSIS — Z90.710 ACQUIRED ABSENCE OF BOTH CERVIX AND UTERUS: Chronic | ICD-10-CM

## 2024-03-04 LAB
ABO RH CONFIRMATION: SIGNIFICANT CHANGE UP
ANION GAP SERPL CALC-SCNC: 10 MMOL/L — SIGNIFICANT CHANGE UP (ref 5–17)
BUN SERPL-MCNC: 17 MG/DL — SIGNIFICANT CHANGE UP (ref 7–23)
CALCIUM SERPL-MCNC: 8.5 MG/DL — SIGNIFICANT CHANGE UP (ref 8.4–10.5)
CHLORIDE SERPL-SCNC: 104 MMOL/L — SIGNIFICANT CHANGE UP (ref 96–108)
CO2 SERPL-SCNC: 23 MMOL/L — SIGNIFICANT CHANGE UP (ref 22–31)
CREAT SERPL-MCNC: 1 MG/DL — SIGNIFICANT CHANGE UP (ref 0.5–1.3)
EGFR: 61 ML/MIN/1.73M2 — SIGNIFICANT CHANGE UP
GLUCOSE SERPL-MCNC: 186 MG/DL — HIGH (ref 70–99)
HCT VFR BLD CALC: 33.5 % — LOW (ref 34.5–45)
HGB BLD-MCNC: 10.7 G/DL — LOW (ref 11.5–15.5)
MCHC RBC-ENTMCNC: 30.3 PG — SIGNIFICANT CHANGE UP (ref 27–34)
MCHC RBC-ENTMCNC: 31.9 GM/DL — LOW (ref 32–36)
MCV RBC AUTO: 94.9 FL — SIGNIFICANT CHANGE UP (ref 80–100)
NRBC # BLD: 0 /100 WBCS — SIGNIFICANT CHANGE UP (ref 0–0)
PLATELET # BLD AUTO: 293 K/UL — SIGNIFICANT CHANGE UP (ref 150–400)
POTASSIUM SERPL-MCNC: 4.3 MMOL/L — SIGNIFICANT CHANGE UP (ref 3.5–5.3)
POTASSIUM SERPL-SCNC: 4.3 MMOL/L — SIGNIFICANT CHANGE UP (ref 3.5–5.3)
RBC # BLD: 3.53 M/UL — LOW (ref 3.8–5.2)
RBC # FLD: 12.8 % — SIGNIFICANT CHANGE UP (ref 10.3–14.5)
SODIUM SERPL-SCNC: 137 MMOL/L — SIGNIFICANT CHANGE UP (ref 135–145)
WBC # BLD: 21.8 K/UL — HIGH (ref 3.8–10.5)
WBC # FLD AUTO: 21.8 K/UL — HIGH (ref 3.8–10.5)

## 2024-03-04 PROCEDURE — 20704 MNL PREP&INSJ I-ARTIC RX DEV: CPT | Mod: 59

## 2024-03-04 PROCEDURE — 27134 REVISE HIP JOINT REPLACEMENT: CPT | Mod: RT

## 2024-03-04 PROCEDURE — 88300 SURGICAL PATH GROSS: CPT | Mod: 26

## 2024-03-04 PROCEDURE — 99222 1ST HOSP IP/OBS MODERATE 55: CPT

## 2024-03-04 PROCEDURE — 73502 X-RAY EXAM HIP UNI 2-3 VIEWS: CPT | Mod: 26,RT

## 2024-03-04 PROCEDURE — 73501 X-RAY EXAM HIP UNI 1 VIEW: CPT | Mod: 26,XU

## 2024-03-04 PROCEDURE — 27134 REVISE HIP JOINT REPLACEMENT: CPT | Mod: AS,RT

## 2024-03-04 DEVICE — SCREW SELF TAP 6.5X30MM: Type: IMPLANTABLE DEVICE | Status: FUNCTIONAL

## 2024-03-04 DEVICE — IMPLANTABLE DEVICE: Type: IMPLANTABLE DEVICE | Status: FUNCTIONAL

## 2024-03-04 DEVICE — INSERT FEM BIOLOX TAPER ADAPTER PLUS 3MM: Type: IMPLANTABLE DEVICE | Status: FUNCTIONAL

## 2024-03-04 DEVICE — HEAD BIOLOX DELTA 28MM: Type: IMPLANTABLE DEVICE | Status: FUNCTIONAL

## 2024-03-04 DEVICE — SCREW SELF TAP 6.5X25MM: Type: IMPLANTABLE DEVICE | Status: FUNCTIONAL

## 2024-03-04 DEVICE — BONE FILLER BIOCOMPOSITE STIMULAN RAPID CURE 10CC: Type: IMPLANTABLE DEVICE | Status: FUNCTIONAL

## 2024-03-04 DEVICE — SCREW ACET SELF TAP 6.5X35MM: Type: IMPLANTABLE DEVICE | Status: FUNCTIONAL

## 2024-03-04 DEVICE — SCREW G7 6.5X15MM: Type: IMPLANTABLE DEVICE | Status: FUNCTIONAL

## 2024-03-04 DEVICE — SCREW BONE SLF TAP 6.5X40MM: Type: IMPLANTABLE DEVICE | Status: FUNCTIONAL

## 2024-03-04 DEVICE — SCREW ACET SELF TAP 6.5X20MM: Type: IMPLANTABLE DEVICE | Status: FUNCTIONAL

## 2024-03-04 RX ORDER — HYDROMORPHONE HYDROCHLORIDE 2 MG/ML
0.2 INJECTION INTRAMUSCULAR; INTRAVENOUS; SUBCUTANEOUS
Refills: 0 | Status: DISCONTINUED | OUTPATIENT
Start: 2024-03-04 | End: 2024-03-04

## 2024-03-04 RX ORDER — LEVOTHYROXINE SODIUM 125 MCG
75 TABLET ORAL DAILY
Refills: 0 | Status: DISCONTINUED | OUTPATIENT
Start: 2024-03-04 | End: 2024-03-06

## 2024-03-04 RX ORDER — PANTOPRAZOLE SODIUM 20 MG/1
40 TABLET, DELAYED RELEASE ORAL
Refills: 0 | Status: DISCONTINUED | OUTPATIENT
Start: 2024-03-04 | End: 2024-03-06

## 2024-03-04 RX ORDER — OXYCODONE HYDROCHLORIDE 5 MG/1
5 TABLET ORAL ONCE
Refills: 0 | Status: DISCONTINUED | OUTPATIENT
Start: 2024-03-04 | End: 2024-03-04

## 2024-03-04 RX ORDER — SODIUM CHLORIDE 9 MG/ML
500 INJECTION INTRAMUSCULAR; INTRAVENOUS; SUBCUTANEOUS ONCE
Refills: 0 | Status: COMPLETED | OUTPATIENT
Start: 2024-03-04 | End: 2024-03-05

## 2024-03-04 RX ORDER — ACETAMINOPHEN 500 MG
1000 TABLET ORAL EVERY 8 HOURS
Refills: 0 | Status: DISCONTINUED | OUTPATIENT
Start: 2024-03-05 | End: 2024-03-06

## 2024-03-04 RX ORDER — CEFAZOLIN SODIUM 1 G
2000 VIAL (EA) INJECTION EVERY 8 HOURS
Refills: 0 | Status: DISCONTINUED | OUTPATIENT
Start: 2024-03-05 | End: 2024-03-05

## 2024-03-04 RX ORDER — TRANEXAMIC ACID 100 MG/ML
1000 INJECTION, SOLUTION INTRAVENOUS ONCE
Refills: 0 | Status: COMPLETED | OUTPATIENT
Start: 2024-03-04 | End: 2024-03-04

## 2024-03-04 RX ORDER — APIXABAN 2.5 MG/1
2.5 TABLET, FILM COATED ORAL EVERY 12 HOURS
Refills: 0 | Status: DISCONTINUED | OUTPATIENT
Start: 2024-03-05 | End: 2024-03-06

## 2024-03-04 RX ORDER — CHLORHEXIDINE GLUCONATE 213 G/1000ML
1 SOLUTION TOPICAL ONCE
Refills: 0 | Status: COMPLETED | OUTPATIENT
Start: 2024-03-04 | End: 2024-03-04

## 2024-03-04 RX ORDER — POLYETHYLENE GLYCOL 3350 17 G/17G
17 POWDER, FOR SOLUTION ORAL AT BEDTIME
Refills: 0 | Status: DISCONTINUED | OUTPATIENT
Start: 2024-03-04 | End: 2024-03-06

## 2024-03-04 RX ORDER — ERGOCALCIFEROL 1.25 MG/1
0 CAPSULE ORAL
Refills: 0 | DISCHARGE

## 2024-03-04 RX ORDER — SODIUM CHLORIDE 9 MG/ML
1000 INJECTION, SOLUTION INTRAVENOUS
Refills: 0 | Status: DISCONTINUED | OUTPATIENT
Start: 2024-03-04 | End: 2024-03-04

## 2024-03-04 RX ORDER — SODIUM CHLORIDE 9 MG/ML
1000 INJECTION, SOLUTION INTRAVENOUS
Refills: 0 | Status: DISCONTINUED | OUTPATIENT
Start: 2024-03-04 | End: 2024-03-06

## 2024-03-04 RX ORDER — SENNA PLUS 8.6 MG/1
2 TABLET ORAL AT BEDTIME
Refills: 0 | Status: DISCONTINUED | OUTPATIENT
Start: 2024-03-04 | End: 2024-03-06

## 2024-03-04 RX ORDER — OXYCODONE HYDROCHLORIDE 5 MG/1
5 TABLET ORAL
Refills: 0 | Status: DISCONTINUED | OUTPATIENT
Start: 2024-03-04 | End: 2024-03-06

## 2024-03-04 RX ORDER — ACETAMINOPHEN 500 MG
1000 TABLET ORAL ONCE
Refills: 0 | Status: COMPLETED | OUTPATIENT
Start: 2024-03-04 | End: 2024-03-04

## 2024-03-04 RX ORDER — SODIUM CHLORIDE 9 MG/ML
500 INJECTION INTRAMUSCULAR; INTRAVENOUS; SUBCUTANEOUS ONCE
Refills: 0 | Status: COMPLETED | OUTPATIENT
Start: 2024-03-04 | End: 2024-03-04

## 2024-03-04 RX ORDER — ONDANSETRON 8 MG/1
4 TABLET, FILM COATED ORAL EVERY 6 HOURS
Refills: 0 | Status: DISCONTINUED | OUTPATIENT
Start: 2024-03-04 | End: 2024-03-06

## 2024-03-04 RX ORDER — OXYCODONE HYDROCHLORIDE 5 MG/1
10 TABLET ORAL
Refills: 0 | Status: DISCONTINUED | OUTPATIENT
Start: 2024-03-04 | End: 2024-03-06

## 2024-03-04 RX ORDER — MAGNESIUM HYDROXIDE 400 MG/1
30 TABLET, CHEWABLE ORAL DAILY
Refills: 0 | Status: DISCONTINUED | OUTPATIENT
Start: 2024-03-04 | End: 2024-03-06

## 2024-03-04 RX ORDER — HYDROMORPHONE HYDROCHLORIDE 2 MG/ML
0.5 INJECTION INTRAMUSCULAR; INTRAVENOUS; SUBCUTANEOUS
Refills: 0 | Status: DISCONTINUED | OUTPATIENT
Start: 2024-03-04 | End: 2024-03-06

## 2024-03-04 RX ORDER — ONDANSETRON 8 MG/1
4 TABLET, FILM COATED ORAL ONCE
Refills: 0 | Status: COMPLETED | OUTPATIENT
Start: 2024-03-04 | End: 2024-03-04

## 2024-03-04 RX ORDER — LEVOTHYROXINE SODIUM 125 MCG
1 TABLET ORAL
Refills: 0 | DISCHARGE

## 2024-03-04 RX ORDER — CEFAZOLIN SODIUM 1 G
2000 VIAL (EA) INJECTION ONCE
Refills: 0 | Status: COMPLETED | OUTPATIENT
Start: 2024-03-04 | End: 2024-03-04

## 2024-03-04 RX ORDER — HYDROMORPHONE HYDROCHLORIDE 2 MG/ML
0.5 INJECTION INTRAMUSCULAR; INTRAVENOUS; SUBCUTANEOUS
Refills: 0 | Status: DISCONTINUED | OUTPATIENT
Start: 2024-03-04 | End: 2024-03-04

## 2024-03-04 RX ORDER — DEXAMETHASONE 0.5 MG/5ML
8 ELIXIR ORAL ONCE
Refills: 0 | Status: COMPLETED | OUTPATIENT
Start: 2024-03-05 | End: 2024-03-05

## 2024-03-04 RX ORDER — APREPITANT 80 MG/1
40 CAPSULE ORAL ONCE
Refills: 0 | Status: COMPLETED | OUTPATIENT
Start: 2024-03-04 | End: 2024-03-04

## 2024-03-04 RX ORDER — CELECOXIB 200 MG/1
200 CAPSULE ORAL EVERY 12 HOURS
Refills: 0 | Status: DISCONTINUED | OUTPATIENT
Start: 2024-03-04 | End: 2024-03-06

## 2024-03-04 RX ADMIN — Medication 400 MILLIGRAM(S): at 23:27

## 2024-03-04 RX ADMIN — SODIUM CHLORIDE 125 MILLILITER(S): 9 INJECTION, SOLUTION INTRAVENOUS at 23:27

## 2024-03-04 RX ADMIN — SODIUM CHLORIDE 75 MILLILITER(S): 9 INJECTION, SOLUTION INTRAVENOUS at 21:09

## 2024-03-04 RX ADMIN — SODIUM CHLORIDE 500 MILLILITER(S): 9 INJECTION INTRAMUSCULAR; INTRAVENOUS; SUBCUTANEOUS at 21:09

## 2024-03-04 RX ADMIN — HYDROMORPHONE HYDROCHLORIDE 0.5 MILLIGRAM(S): 2 INJECTION INTRAMUSCULAR; INTRAVENOUS; SUBCUTANEOUS at 21:30

## 2024-03-04 RX ADMIN — CHLORHEXIDINE GLUCONATE 1 APPLICATION(S): 213 SOLUTION TOPICAL at 11:43

## 2024-03-04 RX ADMIN — ONDANSETRON 4 MILLIGRAM(S): 8 TABLET, FILM COATED ORAL at 21:46

## 2024-03-04 RX ADMIN — APREPITANT 40 MILLIGRAM(S): 80 CAPSULE ORAL at 11:43

## 2024-03-04 RX ADMIN — Medication 1000 MILLIGRAM(S): at 23:34

## 2024-03-04 RX ADMIN — HYDROMORPHONE HYDROCHLORIDE 0.5 MILLIGRAM(S): 2 INJECTION INTRAMUSCULAR; INTRAVENOUS; SUBCUTANEOUS at 21:18

## 2024-03-04 NOTE — CONSULT NOTE ADULT - SUBJECTIVE AND OBJECTIVE BOX
Patient is a 68y old  Female who presents with a chief complaint of     HPI:        PAST MEDICAL & SURGICAL HISTORY:  Hypothyroidism, unspecified type      Osteoarthritis of right hip      Metallosis      S/P laparoscopic cholecystectomy  4/2018      History of hip replacement, total, right  2004      H/O cosmetic surgery  Nose 2000      H/O: hysterectomy  age 49      H/O hemorrhoidectomy  2000      S/P hip replacement, left      Status post laparoscopic sleeve gastrectomy          Allergies    fresh fruit (Other)  penicillins (Rash; Pruritus; Hives)  Nuts (Other)    Intolerances        Home Medications:  ergocalciferol 1.25 mg (50,000 intl units) oral tablet: orally once a week (04 Mar 2024 11:35)  Synthroid 75 mcg (0.075 mg) oral tablet: 1 tab(s) orally once a day (04 Mar 2024 11:35)      MEDICATIONS  (STANDING):  celecoxib 200 milliGRAM(s) Oral every 12 hours  lactated ringers. 1000 milliLiter(s) (125 mL/Hr) IV Continuous <Continuous>  levothyroxine 75 MICROGram(s) Oral daily  pantoprazole    Tablet 40 milliGRAM(s) Oral before breakfast  polyethylene glycol 3350 17 Gram(s) Oral at bedtime  senna 2 Tablet(s) Oral at bedtime  sodium chloride 0.9% Bolus 500 milliLiter(s) IV Bolus once    MEDICATIONS  (PRN):  aluminum hydroxide/magnesium hydroxide/simethicone Suspension 30 milliLiter(s) Oral four times a day PRN Indigestion  HYDROmorphone  Injectable 0.5 milliGRAM(s) IV Push every 3 hours PRN Breakthrough pain  magnesium hydroxide Suspension 30 milliLiter(s) Oral daily PRN Constipation  ondansetron Injectable 4 milliGRAM(s) IV Push every 6 hours PRN Nausea and/or Vomiting  oxyCODONE    IR 10 milliGRAM(s) Oral every 3 hours PRN Severe Pain (7 - 10)  oxyCODONE    IR 5 milliGRAM(s) Oral every 3 hours PRN Moderate Pain (4 - 6)      FAMILY HISTORY:  FH: renal cell carcinoma (Father)    Family history of bladder cancer (Mother)    Family history of ulcerative colitis (Sibling)    Family history of DVT (Sibling)    FH: pulmonary embolism (Sibling)        Social History:     REVIEW OF SYSTEMS:  CONSTITUTIONAL: No fever or chills.   EYES: No eye pain or discharge  ENMT: No sinus or throat pain  NECK: No pain or stiffness  BREASTS: No pain, masses, or nipple discharge  RESPIRATORY: No cough or shortness of breath  CARDIOVASCULAR: No chest pain, palpitations, dizziness.   GASTROINTESTINAL: No abdominal or epigastric pain. No nausea or vomiting.  GENITOURINARY: No dysuria, hematuria, or incontinence  NEUROLOGICAL: No headaches,  numbness, or tremors  SKIN: No itching, burning, rashes, or lesions   LYMPH NODES: No enlarged glands  ENDOCRINE: No polydipsia or polyuria  MUSCULOSKELETAL: No joint pain or swelling;  PSYCHIATRIC: No difficulty sleeping  HEME/LYMPH: No easy bruising, or bleeding gums  ALLERGY AND IMMUNOLOGIC: No hives or eczema    Vital Signs Last 24 Hrs  T(C): 36.6 (04 Mar 2024 22:54), Max: 36.7 (04 Mar 2024 20:45)  T(F): 97.9 (04 Mar 2024 22:54), Max: 98.1 (04 Mar 2024 20:45)  HR: 100 (04 Mar 2024 22:54) (86 - 100)  BP: 108/71 (04 Mar 2024 22:54) (101/66 - 127/98)  BP(mean): --  RR: 15 (04 Mar 2024 22:54) (10 - 17)  SpO2: 97% (04 Mar 2024 22:54) (97% - 100%)    Parameters below as of 04 Mar 2024 22:30  Patient On (Oxygen Delivery Method): nasal cannula  O2 Flow (L/min): 2      PHYSICAL EXAM:  GENERAL: NAD, well-groomed, well-developed  HEAD:  Atraumatic, Normocephalic  EYES:  Pallor +  ENMT: Moist mucous membranes, no lesions  NECK: Supple, No JVD, Normal thyroid  CHEST/LUNG: Decreased breath sounds at bases, rest is clear.   HEART: Regular rate and rhythm; No murmurs, rubs, or gallops  ABDOMEN: Soft, Nontender, Nondistended; Bowel sounds present  EXTREMITIES:  Pulses +  LYMPH: No lymphadenopathy noted  SKIN: No rashes or lesions  NERVOUS SYSTEM:  No focal deficit.   PSYCH:  Awake and alert.    LABS:                        10.7   21.80 )-----------( 293      ( 04 Mar 2024 21:10 )             33.5     04 Mar 2024 21:10    137    |  104    |  17     ----------------------------<  186    4.3     |  23     |  1.00     Ca    8.5        04 Mar 2024 21:10        Urinalysis Basic - ( 04 Mar 2024 21:10 )    Color: x / Appearance: x / SG: x / pH: x  Gluc: 186 mg/dL / Ketone: x  / Bili: x / Urobili: x   Blood: x / Protein: x / Nitrite: x   Leuk Esterase: x / RBC: x / WBC x   Sq Epi: x / Non Sq Epi: x / Bacteria: x      CAPILLARY BLOOD GLUCOSE            RADIOLOGY & ADDITIONAL TESTS:    Imaging Personally Reviewed:  [X] YES  [ ] NO   Patient is a 68y old  Female who presents with a chief complaint of Right hip pain    HPI:68-year-old female with past medical history of hypothyroidism, cholecystectomy, osteoarthritis, who has been having increasing pain in her right hip. patient had undergone right hip replacement in the past. 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  Patient was scheduled for revision right total hip replacement.  Patient is seen postoperatively for medical comanagement.    outpatient records are reviewed.  Patient complains of some right hip pain.  She denies any chest pain or chest pressure.  She denies any nausea or vomiting.  She denies any fevers or chills.    PAST MEDICAL & SURGICAL HISTORY:  Hypothyroidism, unspecified type      Osteoarthritis of right hip      Metallosis      S/P laparoscopic cholecystectomy  4/2018      History of hip replacement, total, right  2004      H/O cosmetic surgery  Nose 2000      H/O: hysterectomy  age 49      H/O hemorrhoidectomy  2000      S/P hip replacement, left      Status post laparoscopic sleeve gastrectomy          Allergies    fresh fruit (Other)  penicillins (Rash; Pruritus; Hives)  Nuts (Other)    Intolerances        Home Medications:  ergocalciferol 1.25 mg (50,000 intl units) oral tablet: orally once a week (04 Mar 2024 11:35)  Synthroid 75 mcg (0.075 mg) oral tablet: 1 tab(s) orally once a day (04 Mar 2024 11:35)      MEDICATIONS  (STANDING):  celecoxib 200 milliGRAM(s) Oral every 12 hours  lactated ringers. 1000 milliLiter(s) (125 mL/Hr) IV Continuous <Continuous>  levothyroxine 75 MICROGram(s) Oral daily  pantoprazole    Tablet 40 milliGRAM(s) Oral before breakfast  polyethylene glycol 3350 17 Gram(s) Oral at bedtime  senna 2 Tablet(s) Oral at bedtime  sodium chloride 0.9% Bolus 500 milliLiter(s) IV Bolus once    MEDICATIONS  (PRN):  aluminum hydroxide/magnesium hydroxide/simethicone Suspension 30 milliLiter(s) Oral four times a day PRN Indigestion  HYDROmorphone  Injectable 0.5 milliGRAM(s) IV Push every 3 hours PRN Breakthrough pain  magnesium hydroxide Suspension 30 milliLiter(s) Oral daily PRN Constipation  ondansetron Injectable 4 milliGRAM(s) IV Push every 6 hours PRN Nausea and/or Vomiting  oxyCODONE    IR 10 milliGRAM(s) Oral every 3 hours PRN Severe Pain (7 - 10)  oxyCODONE    IR 5 milliGRAM(s) Oral every 3 hours PRN Moderate Pain (4 - 6)      FAMILY HISTORY:  FH: renal cell carcinoma (Father)    Family history of bladder cancer (Mother)    Family history of ulcerative colitis (Sibling)    Family history of DVT (Sibling)    FH: pulmonary embolism (Sibling)        Social History: patient is a former smoker.  She quit in 1992.  She has 20-pack-year history.  She drinks alcoholic beverages occasionally.    REVIEW OF SYSTEMS:  CONSTITUTIONAL: No fever or chills.   EYES: No eye pain or discharge  ENMT: No sinus or throat pain  NECK: No pain or stiffness  BREASTS: No pain, masses, or nipple discharge  RESPIRATORY: No cough or shortness of breath  CARDIOVASCULAR: No chest pain, palpitations, dizziness.   GASTROINTESTINAL: No abdominal or epigastric pain. No nausea or vomiting.  GENITOURINARY: No dysuria, hematuria, or incontinence  NEUROLOGICAL: No headaches,  numbness, or tremors  SKIN: No itching, burning, rashes, or lesions   LYMPH NODES: No enlarged glands  ENDOCRINE: No polydipsia or polyuria  MUSCULOSKELETAL: positive for right hip pain.  PSYCHIATRIC: No difficulty sleeping  HEME/LYMPH: No easy bruising, or bleeding gums  ALLERGY AND IMMUNOLOGIC: No hives or eczema    Vital Signs Last 24 Hrs  T(C): 36.6 (04 Mar 2024 22:54), Max: 36.7 (04 Mar 2024 20:45)  T(F): 97.9 (04 Mar 2024 22:54), Max: 98.1 (04 Mar 2024 20:45)  HR: 100 (04 Mar 2024 22:54) (86 - 100)  BP: 108/71 (04 Mar 2024 22:54) (101/66 - 127/98)  BP(mean): --  RR: 15 (04 Mar 2024 22:54) (10 - 17)  SpO2: 97% (04 Mar 2024 22:54) (97% - 100%)    Parameters below as of 04 Mar 2024 22:30  Patient On (Oxygen Delivery Method): nasal cannula  O2 Flow (L/min): 2      PHYSICAL EXAM:  GENERAL: NAD, well-groomed, well-developed  HEAD:  Atraumatic, Normocephalic  EYES:  Pallor +  ENMT: Moist mucous membranes, no lesions  NECK: Supple, No JVD, Normal thyroid  CHEST/LUNG: Decreased breath sounds at bases, rest is clear.   HEART: Regular rate and rhythm; No murmurs, rubs, or gallops  ABDOMEN: Soft, Nontender, Nondistended; Bowel sounds present  EXTREMITIES:  right hip dressing is noted.  No calf tenderness noted. Pulses +  LYMPH: No lymphadenopathy noted  SKIN: No rashes or lesions  NERVOUS SYSTEM:  No focal deficit.   PSYCH:  Awake and alert.    LABS:    Complete Blood Count (02.20.24 @ 09:04)   Nucleated RBC: 0 /100 WBCs  WBC Count: 5.50 K/uL  RBC Count: 4.21 M/uL  Hemoglobin: 12.5 g/dL  Hematocrit: 39.4 %  Mean Cell Volume: 93.6 fl  Mean Cell Hemoglobin: 29.7 pg  Mean Cell Hemoglobin Conc: 31.7 gm/dL  Red Cell Distrib Width: 12.6 %  Platelet Count - Automated: 243 K/uL    Comprehensive Metabolic Panel (02.20.24 @ 09:04)   Sodium: 142 mmol/L  Potassium: 5.2 mmol/L  Chloride: 108 mmol/L  Carbon Dioxide: 27 mmol/L  Anion Gap: 7 mmol/L  Blood Urea Nitrogen: 21 mg/dL  Creatinine: 0.88 mg/dL  Glucose: 87 mg/dL  Calcium: 9.7 mg/dL  Protein Total: 7.0 g/dL  Albumin: 3.6 g/dL  Bilirubin Total: 0.4 mg/dL  Alkaline Phosphatase: 101 U/L  Aspartate Aminotransferase (AST/SGOT): 15 U/L  Alanine Aminotransferase (ALT/SGPT): 18 U/L      RADIOLOGY & ADDITIONAL TESTS:    Imaging Personally Reviewed:  [X] YES  [ ] NO

## 2024-03-04 NOTE — BRIEF OPERATIVE NOTE - NSICDXBRIEFPROCEDURE_GEN_ALL_CORE_FT
PROCEDURES:  Revision of right total hip arthroplasty by posterior approach 04-Mar-2024 20:24:45  Jake Iraheta

## 2024-03-04 NOTE — PRE-OP CHECKLIST - NS PREOP CHK MONITOR ANESTHESIA CONSENT
Reviewed discharge instructions with patient, discussed medications and addressed all patient questions/concerns. Pt verbalizes understanding. Pt picked up by PAS ambulette. Report given. All applicable paperwork provided.        Claudeen Anis, RN  05/24/23 [id number]
done

## 2024-03-04 NOTE — CONSULT NOTE ADULT - ASSESSMENT
68-year-old female with past medical history of hypothyroidism, cholecystectomy, osteoarthritis, who has been having increasing pain in her right hip. patient had undergone right hip replacement in the past. 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  Patient was scheduled for revision right total hip replacement.  Patient is seen postoperatively for medical comanagement.

## 2024-03-04 NOTE — PATIENT PROFILE ADULT - CAREGIVER ADDRESS
Pt. informed of antibiotic order. He is unable to drop off urine specimen. I told him he needs to see Urology re: recurrent UTIs.   [address] [place], [place] [id number]

## 2024-03-04 NOTE — PATIENT PROFILE ADULT - DO YOU LACK THE NECESSARY SUPPORT TO HELP YOU COPE WITH LIFE CHALLENGES?
1 Principal Discharge DX:	Acute chest pain  Secondary Diagnosis:	Dizzy  Secondary Diagnosis:	Headache   no

## 2024-03-04 NOTE — CONSULT NOTE ADULT - PROBLEM SELECTOR RECOMMENDATION 9
Patient is status post revision right total hip arthroplasty, by posterior approach, postop day #0.  Maintain posterior hip precautions.  continue perioperative antibiotics per protocol.  Continue IV hydration per routine.  patient will be started on Eliquis for DVT prophylaxis.  Continue patient on multimodal pain management with Tylenol/Celebrex/oxycodone/Dilaudid as needed.  Encourage incentive spirometry.  Continue physical therapy.  Monitor for postop anemia and postop fever.  Monitor labs.

## 2024-03-04 NOTE — PRE-OP CHECKLIST - WAS PATIENT ON BETA BLOCKER?
Needs fluoride in her water to build strong bones and teeth.  Your options are:    Tap water with or without filter  Or bottled water with fluoride - Nursery water is the most common brand.      
No

## 2024-03-05 ENCOUNTER — TRANSCRIPTION ENCOUNTER (OUTPATIENT)
Age: 69
End: 2024-03-05

## 2024-03-05 DIAGNOSIS — E03.9 HYPOTHYROIDISM, UNSPECIFIED: ICD-10-CM

## 2024-03-05 DIAGNOSIS — Z29.9 ENCOUNTER FOR PROPHYLACTIC MEASURES, UNSPECIFIED: ICD-10-CM

## 2024-03-05 DIAGNOSIS — M25.551 PAIN IN RIGHT HIP: ICD-10-CM

## 2024-03-05 LAB
ANION GAP SERPL CALC-SCNC: 12 MMOL/L — SIGNIFICANT CHANGE UP (ref 5–17)
BUN SERPL-MCNC: 20 MG/DL — SIGNIFICANT CHANGE UP (ref 7–23)
CALCIUM SERPL-MCNC: 7.8 MG/DL — LOW (ref 8.4–10.5)
CHLORIDE SERPL-SCNC: 107 MMOL/L — SIGNIFICANT CHANGE UP (ref 96–108)
CO2 SERPL-SCNC: 22 MMOL/L — SIGNIFICANT CHANGE UP (ref 22–31)
CREAT SERPL-MCNC: 0.99 MG/DL — SIGNIFICANT CHANGE UP (ref 0.5–1.3)
EGFR: 62 ML/MIN/1.73M2 — SIGNIFICANT CHANGE UP
GLUCOSE SERPL-MCNC: 129 MG/DL — HIGH (ref 70–99)
GRAM STN FLD: SIGNIFICANT CHANGE UP
HCT VFR BLD CALC: 22.4 % — LOW (ref 34.5–45)
HGB BLD-MCNC: 7.2 G/DL — LOW (ref 11.5–15.5)
MCHC RBC-ENTMCNC: 30.1 PG — SIGNIFICANT CHANGE UP (ref 27–34)
MCHC RBC-ENTMCNC: 32.1 GM/DL — SIGNIFICANT CHANGE UP (ref 32–36)
MCV RBC AUTO: 93.7 FL — SIGNIFICANT CHANGE UP (ref 80–100)
NRBC # BLD: 0 /100 WBCS — SIGNIFICANT CHANGE UP (ref 0–0)
PLATELET # BLD AUTO: 187 K/UL — SIGNIFICANT CHANGE UP (ref 150–400)
POTASSIUM SERPL-MCNC: 4.7 MMOL/L — SIGNIFICANT CHANGE UP (ref 3.5–5.3)
POTASSIUM SERPL-SCNC: 4.7 MMOL/L — SIGNIFICANT CHANGE UP (ref 3.5–5.3)
RBC # BLD: 2.39 M/UL — LOW (ref 3.8–5.2)
RBC # FLD: 13 % — SIGNIFICANT CHANGE UP (ref 10.3–14.5)
SODIUM SERPL-SCNC: 141 MMOL/L — SIGNIFICANT CHANGE UP (ref 135–145)
SPECIMEN SOURCE: SIGNIFICANT CHANGE UP
WBC # BLD: 11.84 K/UL — HIGH (ref 3.8–10.5)
WBC # FLD AUTO: 11.84 K/UL — HIGH (ref 3.8–10.5)

## 2024-03-05 PROCEDURE — 99232 SBSQ HOSP IP/OBS MODERATE 35: CPT

## 2024-03-05 RX ORDER — SODIUM CHLORIDE 9 MG/ML
1000 INJECTION, SOLUTION INTRAVENOUS ONCE
Refills: 0 | Status: COMPLETED | OUTPATIENT
Start: 2024-03-05 | End: 2024-03-05

## 2024-03-05 RX ORDER — APIXABAN 2.5 MG/1
1 TABLET, FILM COATED ORAL
Qty: 56 | Refills: 0
Start: 2024-03-05 | End: 2024-04-01

## 2024-03-05 RX ORDER — CELECOXIB 200 MG/1
1 CAPSULE ORAL
Qty: 60 | Refills: 0
Start: 2024-03-05 | End: 2024-04-03

## 2024-03-05 RX ORDER — OXYCODONE HYDROCHLORIDE 5 MG/1
1 TABLET ORAL
Qty: 42 | Refills: 0
Start: 2024-03-05 | End: 2024-03-11

## 2024-03-05 RX ORDER — OMEPRAZOLE 10 MG/1
1 CAPSULE, DELAYED RELEASE ORAL
Qty: 30 | Refills: 1
Start: 2024-03-05 | End: 2024-05-03

## 2024-03-05 RX ORDER — ACETAMINOPHEN 500 MG
2 TABLET ORAL
Qty: 0 | Refills: 0 | DISCHARGE
Start: 2024-03-05

## 2024-03-05 RX ADMIN — Medication 1000 MILLIGRAM(S): at 21:34

## 2024-03-05 RX ADMIN — Medication 75 MICROGRAM(S): at 05:28

## 2024-03-05 RX ADMIN — CELECOXIB 200 MILLIGRAM(S): 200 CAPSULE ORAL at 21:44

## 2024-03-05 RX ADMIN — APIXABAN 2.5 MILLIGRAM(S): 2.5 TABLET, FILM COATED ORAL at 21:35

## 2024-03-05 RX ADMIN — SODIUM CHLORIDE 125 MILLILITER(S): 9 INJECTION, SOLUTION INTRAVENOUS at 08:17

## 2024-03-05 RX ADMIN — SODIUM CHLORIDE 500 MILLILITER(S): 9 INJECTION INTRAMUSCULAR; INTRAVENOUS; SUBCUTANEOUS at 00:00

## 2024-03-05 RX ADMIN — SODIUM CHLORIDE 2000 MILLILITER(S): 9 INJECTION, SOLUTION INTRAVENOUS at 06:06

## 2024-03-05 RX ADMIN — Medication 1000 MILLIGRAM(S): at 14:41

## 2024-03-05 RX ADMIN — Medication 1000 MILLIGRAM(S): at 05:35

## 2024-03-05 RX ADMIN — CELECOXIB 200 MILLIGRAM(S): 200 CAPSULE ORAL at 09:35

## 2024-03-05 RX ADMIN — Medication 101.6 MILLIGRAM(S): at 05:28

## 2024-03-05 RX ADMIN — SENNA PLUS 2 TABLET(S): 8.6 TABLET ORAL at 21:37

## 2024-03-05 RX ADMIN — Medication 1000 MILLIGRAM(S): at 13:36

## 2024-03-05 RX ADMIN — Medication 1000 MILLIGRAM(S): at 05:28

## 2024-03-05 RX ADMIN — CELECOXIB 200 MILLIGRAM(S): 200 CAPSULE ORAL at 10:25

## 2024-03-05 RX ADMIN — Medication 100 MILLIGRAM(S): at 03:42

## 2024-03-05 RX ADMIN — PANTOPRAZOLE SODIUM 40 MILLIGRAM(S): 20 TABLET, DELAYED RELEASE ORAL at 05:28

## 2024-03-05 RX ADMIN — APIXABAN 2.5 MILLIGRAM(S): 2.5 TABLET, FILM COATED ORAL at 08:59

## 2024-03-05 RX ADMIN — CELECOXIB 200 MILLIGRAM(S): 200 CAPSULE ORAL at 21:35

## 2024-03-05 RX ADMIN — Medication 1000 MILLIGRAM(S): at 21:43

## 2024-03-05 RX ADMIN — Medication 100 MILLIGRAM(S): at 16:49

## 2024-03-05 NOTE — PROGRESS NOTE ADULT - SUBJECTIVE AND OBJECTIVE BOX
YANG OROURKE [id number]    Pt is a 68y year old Female s/p revision right THR. pain is 3/10. Tolerating regular diet, (+) voids.  Denies chest pain/shortness of breath/nausea/vomitting.     Vital Signs Last 24 Hrs  T(C): 36.9 (05 Mar 2024 03:26), Max: 36.9 (05 Mar 2024 03:26)  T(F): 98.5 (05 Mar 2024 03:26), Max: 98.5 (05 Mar 2024 03:26)  HR: 88 (05 Mar 2024 05:47) (86 - 100)  BP: 91/56 (05 Mar 2024 05:47) (90/56 - 127/98)  BP(mean): --  RR: 18 (05 Mar 2024 03:26) (10 - 18)  SpO2: 100% (05 Mar 2024 03:26) (97% - 100%)    Parameters below as of 05 Mar 2024 03:26  Patient On (Oxygen Delivery Method): nasal cannula        I&O's Detail    04 Mar 2024 07:01  -  05 Mar 2024 07:00  --------------------------------------------------------  IN:    Lactated Ringers: 3000 mL    Sodium Chloride 0.9% Bolus: 500 mL  Total IN: 3500 mL    OUT:    Blood Loss (mL): 650 mL    Ureteral Catheter (mL): 1010 mL  Total OUT: 1660 mL    Total NET: 1840 mL                                10.7   21.80 )-----------( 293      ( 04 Mar 2024 21:10 )             33.5     03-04    137  |  104  |  17  ----------------------------<  186<H>  4.3   |  23  |  1.00    Ca    8.5      04 Mar 2024 21:10          PE:   RLE: Dressing CDI, Sensation intact to light touch distally, (+2) DP/PT pulses, EHL/FHL/TA intact, Capillary refill < 2 seconds. negative calf tenderness. PAS on.     A: 68y year old Female s/p right THR POD#1    Plan:   -DVT ppx = PAS +  apixaban 2.5 milliGRAM(s) Oral every 12 hours    -PT/OT = OOB  -Hip dislocation precautions  -Pain control   -Medicine to follow   -Continue to Follow Labs  -Incentive spirometry  -d/c planning: home pending MD and PT/OT clearance

## 2024-03-05 NOTE — PROGRESS NOTE ADULT - SUBJECTIVE AND OBJECTIVE BOX
Labs reviewed. Anemia noted in setting of hypotension. Patient also reports feeling lightheaded when she attempted to get out of bed this morning. Will transfuse one unit of packed red blood cells. Risks/benefits/alternatives discussed at length with patient. Questions answered. Patient verbalized understanding of above and is in agreement. Dr. Quiroz aware.

## 2024-03-05 NOTE — DISCHARGE NOTE PROVIDER - NSDCFUADDAPPT_GEN_ALL_CORE_FT
Physical Therapist to visit the day after hospital discharge; Registered Nurse to follow if there is a need. Please contact the home care agency at the above phone number if you have not heard from them by 12 noon on the day after your hospital discharge    Make an appointment follow up with your primary care physician within 2 weeks of your surgery.    Make an appointment follow up with your primary care physician within 2 weeks of your surgery.

## 2024-03-05 NOTE — PHYSICAL THERAPY INITIAL EVALUATION ADULT - PERTINENT HX OF CURRENT PROBLEM, REHAB EVAL
This is  67y/o female who had undergone right hip replacement 2008 presents with right hip pain for several years despite of surgery .she had experienced severe shocking pain , difficulty walking in the month November 2024,f/u Dr Quiroz , CT scan was done which showed loosening of the acetabulum and  blood work with an elevated ESR , cobalt and chromium .Reports constant pain and increased pain when standing , walking  scheduled for right hip replacement on 3/4/24

## 2024-03-05 NOTE — PROGRESS NOTE ADULT - SUBJECTIVE AND OBJECTIVE BOX
PROGRESS NOTE:   Authored by Dr. Leroy Hager MD, Available on MS Teams    Patient is a 68y old  Female who presents with a chief complaint of right hip s/p revision right total hip  (05 Mar 2024 07:37)      SUBJECTIVE / OVERNIGHT EVENTS: Patient has some R hip discomfort. No chest pain or shortness of breath. Has some dizziness, lightheadedness     ADDITIONAL REVIEW OF SYSTEMS:    MEDICATIONS  (STANDING):  acetaminophen     Tablet .. 1000 milliGRAM(s) Oral every 8 hours  apixaban 2.5 milliGRAM(s) Oral every 12 hours  ceFAZolin   IVPB 2000 milliGRAM(s) IV Intermittent every 8 hours  celecoxib 200 milliGRAM(s) Oral every 12 hours  lactated ringers. 1000 milliLiter(s) (125 mL/Hr) IV Continuous <Continuous>  levothyroxine 75 MICROGram(s) Oral daily  pantoprazole    Tablet 40 milliGRAM(s) Oral before breakfast  polyethylene glycol 3350 17 Gram(s) Oral at bedtime  senna 2 Tablet(s) Oral at bedtime    MEDICATIONS  (PRN):  aluminum hydroxide/magnesium hydroxide/simethicone Suspension 30 milliLiter(s) Oral four times a day PRN Indigestion  HYDROmorphone  Injectable 0.5 milliGRAM(s) IV Push every 3 hours PRN Breakthrough pain  magnesium hydroxide Suspension 30 milliLiter(s) Oral daily PRN Constipation  ondansetron Injectable 4 milliGRAM(s) IV Push every 6 hours PRN Nausea and/or Vomiting  oxyCODONE    IR 10 milliGRAM(s) Oral every 3 hours PRN Severe Pain (7 - 10)  oxyCODONE    IR 5 milliGRAM(s) Oral every 3 hours PRN Moderate Pain (4 - 6)      CAPILLARY BLOOD GLUCOSE        I&O's Summary    04 Mar 2024 07:01  -  05 Mar 2024 07:00  --------------------------------------------------------  IN: 3500 mL / OUT: 1660 mL / NET: 1840 mL        PHYSICAL EXAM:  Vital Signs Last 24 Hrs  T(C): 36.7 (05 Mar 2024 11:25), Max: 36.9 (05 Mar 2024 03:26)  T(F): 98.1 (05 Mar 2024 11:25), Max: 98.5 (05 Mar 2024 03:26)  HR: 81 (05 Mar 2024 11:25) (81 - 100)  BP: 93/56 (05 Mar 2024 11:25) (90/56 - 127/98)  BP(mean): --  RR: 18 (05 Mar 2024 11:25) (12 - 18)  SpO2: 97% (05 Mar 2024 11:25) (96% - 100%)    Parameters below as of 05 Mar 2024 11:25  Patient On (Oxygen Delivery Method): room air        CONSTITUTIONAL: NAD  RESPIRATORY: Normal respiratory effort; lungs are clear to auscultation bilaterally  CARDIOVASCULAR: Regular rate and rhythm, normal S1 and S2, no murmur/rub/gallop; No lower extremity edema  ABDOMEN: Nontender to palpation, normoactive bowel sounds, no rebound/guarding  MUSCLOSKELETAL: no clubbing or cyanosis of digits  PSYCH: A+O to person, place, and time; affect appropriate    LABS:                        7.2    11.84 )-----------( 187      ( 05 Mar 2024 08:17 )             22.4     03-05    141  |  107  |  20  ----------------------------<  129<H>  4.7   |  22  |  0.99    Ca    7.8<L>      05 Mar 2024 08:17            Urinalysis Basic - ( 05 Mar 2024 08:17 )    Color: x / Appearance: x / SG: x / pH: x  Gluc: 129 mg/dL / Ketone: x  / Bili: x / Urobili: x   Blood: x / Protein: x / Nitrite: x   Leuk Esterase: x / RBC: x / WBC x   Sq Epi: x / Non Sq Epi: x / Bacteria: x

## 2024-03-05 NOTE — PROGRESS NOTE ADULT - SUBJECTIVE AND OBJECTIVE BOX
Discharge medication calendar:  Eliquis 2.5mg q12h x 4 weeks  Celecoxib 100mg q12h x 2-3 weeks  Omeprazole 20mg QAM x 4 weeks  APAP 1000mg q8h x 2-3 weeks  Narcotic PRN  Docusate 100mg TID while taking narcotic  Miralax, Senna, or Bisacodyl PRN for treatment of constipation  Cefadroxil 500 mg q12h x 7 days

## 2024-03-05 NOTE — DISCHARGE NOTE PROVIDER - NSDCFUSCHEDAPPT_GEN_ALL_CORE_FT
Melvin Quiroz  [facility]  ORTHOSURG [address]  Scheduled Appointment: 03/22/2024

## 2024-03-05 NOTE — PROGRESS NOTE ADULT - ASSESSMENT
68-year-old female with past medical history of hypothyroidism, cholecystectomy, osteoarthritis, who has been having increasing pain in her right hip. patient had undergone right hip replacement in the past. 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  Patient was scheduled for revision right total hip replacement.  Patient is seen postoperatively for medical comanagement.    #Right hip pain  status post revision right total hip arthroplasty POD1  Maintain posterior hip precautions.  continue perioperative antibiotics per protocol  Continue IV hydration per routine  patient will be started on Eliquis for DVT prophylaxis  Continue patient on multimodal pain management with Tylenol/Celebrex/oxycodone/Dilaudid as needed  Encourage incentive spirometry  Continue physical therapy    #Anemia  Likely post op, no signs of active bleeding  Transfuse 1unit of pRBC for Hgb 10-->7.2  Check post transfusion CBC    #Hypotension  Patient w/ soft blood pressures  Hydrate  Transfuse as above    #Hypothyroidism  continue patient on levothyroxine    #Prophylactic measure.   Eliquis for DVT prophylaxis   PPI for GI prophylaxis

## 2024-03-05 NOTE — DISCHARGE NOTE PROVIDER - NSDCMRMEDTOKEN_GEN_ALL_CORE_FT
acetaminophen 500 mg oral tablet: 2 tab(s) orally every 8 hours  CeleBREX 200 mg oral capsule: 1 cap(s) orally every 12 hours take 2 hours after aspirin  Eliquis 2.5 mg oral tablet: 1 tab(s) orally every 12 hours  ergocalciferol 1.25 mg (50,000 intl units) oral tablet: orally once a week  omeprazole 20 mg oral delayed release capsule: 1 cap(s) orally once a day  oxyCODONE 5 mg oral tablet: 1 tab(s) orally every 4 hours as needed for  severe pain make take 2 tabs for severe pain if needed MDD: 6  Synthroid 75 mcg (0.075 mg) oral tablet: 1 tab(s) orally once a day   acetaminophen 500 mg oral tablet: 2 tab(s) orally every 8 hours  apixaban 2.5 mg oral tablet: 1 tab(s) orally every 12 hours Stop after 28 days  bisacodyl 10 mg rectal suppository: 1 suppository(ies) rectal once As needed Constipation  cefadroxil 500 mg oral capsule: 1 cap(s) orally every 12 hours for 7 days  celecoxib 100 mg oral capsule: 1 cap(s) orally 2 times a day  ergocalciferol 1.25 mg (50,000 intl units) oral tablet: orally once a week  omeprazole 20 mg oral delayed release capsule: 1 cap(s) orally once a day  oxyCODONE 10 mg oral tablet: 1 tab(s) orally every 3 hours As needed Severe Pain (7 - 10)  oxyCODONE 5 mg oral tablet: 1 tab(s) orally every 3 hours As needed Moderate Pain (4 - 6)  polyethylene glycol 3350 oral powder for reconstitution: 17 gram(s) orally once a day (at bedtime)  senna leaf extract oral tablet: 2 tab(s) orally once a day (at bedtime)  Synthroid 75 mcg (0.075 mg) oral tablet: 1 tab(s) orally once a day

## 2024-03-05 NOTE — CARE COORDINATION ASSESSMENT. - NSPASTMEDSURGHISTORY_GEN_ALL_CORE_FT
PAST MEDICAL & SURGICAL HISTORY:  Hypothyroidism, unspecified type      H/O hemorrhoidectomy  2000      H/O: hysterectomy  age 49      H/O cosmetic surgery  Nose 2000      History of hip replacement, total, right  2004      S/P laparoscopic cholecystectomy  4/2018      Metallosis      Osteoarthritis of right hip      Status post laparoscopic sleeve gastrectomy      S/P hip replacement, left

## 2024-03-05 NOTE — CARE COORDINATION ASSESSMENT. - NSCAREPROVIDERS_GEN_ALL_CORE_FT
CARE PROVIDERS:  Administration: Rissa Jimenez  Administration: Nichole Fay  Administration: Bj Wang  Administration: Elia Garza  Admitting: Melvin Quiroz  Attending: Melvin Quiroz  Case Management: Santaromana, Anna  Consultant: Sammy Reyes  Consultant: Jean-Pierre Schumacher  Covering Team: Leroy Hager  Infection Control: Elsy Rivera  Nurse: Zenaida Mccauley  Nurse: Alecia Fajardo  Nurse: Ro Tirado  Nurse: Adam, Merline  Nurse: Francesca Sanchez  Occupational Therapy: Nicki Lagos  Ordered: Physician, Ordering  Override: Alecia Fajardo  Override: Alayna Zee  Override: Fern Montaño  PCA/Nursing Assistant: Harsha Long  PCA/Nursing Assistant: Marah Carr  Physical Therapy: Chasidy Campbell  Physical Therapy: Ro Liang  Primary Team: Mally Farias  Primary Team: Janis Anna  Primary Team: Justin Shaver  Primary Team: Jake Perry  Primary Team: Jake Iraheta  Primary Team: Bj Schroeder  Primary Team: Home Villagomez  Primary Team: Ehpraim Harkins  Research: Ky Hedrick  Respiratory Therapy: Zi Benjamin  Respiratory Therapy: Maribel Yang  Respiratory Therapy: Jose Alfredo Solis  : Mildred Jeffries  Team: CIARA  Hospitalists, Team  UR// Supp. Assoc.: Penelope Boss

## 2024-03-05 NOTE — OCCUPATIONAL THERAPY INITIAL EVALUATION ADULT - ADDITIONAL COMMENTS
Pt lives with her  in a split level private home with 6 YESSY with 5 steps inside to pt's bed/bath. Pt has both a tub and a walk-in shower. Pt reports she was independent with all ADLs PTA and was ambulating occasionally with a cane 2* hx of lower back pain. Pt reports her  is available to assist with anything as needed. Pt owns a RW.   Pt completed functional mobility from bed to chair with MinAx1 +RW with c/o dizziness & nausea. Further functional mobility deferred. Pt returned back to bed.

## 2024-03-05 NOTE — DISCHARGE NOTE PROVIDER - NSDCCPTREATMENT_GEN_ALL_CORE_FT
PRINCIPAL PROCEDURE  Procedure: Major revision of total hip replacement  Findings and Treatment:      PRINCIPAL PROCEDURE  Procedure: Major revision of total hip replacement  Findings and Treatment: failed left total hip replacement

## 2024-03-05 NOTE — CARE COORDINATION ASSESSMENT. - OTHER PERTINENT DISCHARGE PLANNING INFORMATION:
69y/o female who had undergone right hip replacement 2008 presents with right hip pain for several years despite of surgery .she had experienced severe shocking pain , difficulty walking in the month November 2024,f/u Dr Quiroz , CT scan was done which showed loosening of the acetabulum and  blood work with an elevated ESR , cobalt and chromium .Reports constant pain and increased pain when standing , walking  scheduled for right hip replacement on 3/4/24. Met patient at bedside.  Explained role of CM, verbalized understanding. Pt was made aware a CM will remain available through hospitalization.  Contact information given in discharge/ transitions resource folder.

## 2024-03-05 NOTE — DISCHARGE NOTE PROVIDER - HOSPITAL COURSE
This is 68y Female patient was admitted to [facility] with a history Failed total hip arthroplasty.  Patient went to Pre-Surgical Testing at [facility] and was medically cleared to undergo elective procedure.    The patient underwent a Revision of right total hip arthroplasty by posterior approach by Melvin Quiroz on 03-04-24.   No operative or zahira-operative complications arose during patients hospital course.    Patient received antibiotic according to SCIP guidelines for infection prevention.   Eliquis was given for DVT prophylaxis.    Anesthesia, Medical Hospitalist, Physical Therapy and Occupational Therapy were consulted. Patient is stable for discharge with a good prognosis.  Appropriate discharge instructions and medications are provided in this document. This is 68y Female patient was admitted to [facility] with a history Failed total hip arthroplasty.  Patient went to Pre-Surgical Testing at [facility] and was medically cleared to undergo elective procedure.    The patient underwent a Revision of right total hip arthroplasty by posterior approach by Melvin Quiroz on 03-04-24.   No operative or zahira-operative complications arose during patients hospital course.    Patient received antibiotic according to SCIP guidelines for infection prevention.   Eliquis 2.5 mg Q12h was given for DVT prophylaxis.    Anesthesia, Medical Hospitalist, Physical Therapy and Occupational Therapy were consulted. Patient is stable for discharge with a good prognosis.  Appropriate discharge instructions and medications are provided in this document.

## 2024-03-05 NOTE — DISCHARGE NOTE NURSING/CASE MANAGEMENT/SOCIAL WORK - PATIENT PORTAL LINK FT
You can access the FollowMyHealth Patient Portal offered by [facility] by registering at the following website: [email or web address]. By joining [employer]’s FollowMyHealth portal, you will also be able to view your health information using other applications (apps) compatible with our system.

## 2024-03-05 NOTE — CARE COORDINATION ASSESSMENT. - PRO ARRIVE FROM
DX: 69y/o female who had undergone right hip replacement 2008 presents with right hip pain for several years despite of surgery .she had experienced severe shocking pain , difficulty walking in the month November 2024,f/u Dr Quiroz , CT scan was done which showed loosening of the acetabulum and  blood work with an elevated ESR , cobalt and chromium .Reports constant pain and increased pain when standing , walking  scheduled for right hip replacement on 3/4/24.    CM met with patient at bedside.  Patients H&H 7.2/22.4 CM made Orthro Team aware. Orthro team stated patient will being going to get a unit of blood. This morning with Physical Therapy patient vasovagal  patient bp dropped to 80/50 as per patient stated.  Patient is alert times 3.  Patient stated lives with spouse Pat [phone]. CM called unable to reach will follow up.  Patient stated has 7 steps to enter in the house and 5 steps to enter her bedroom. Patient stated she owns a rolling walker at home. AS per Orthro patient will be discharge tomorrow 3/6/24.    CM Verified:    PCP:DR. Randal Aguilar [phone].  Ph[facility]acy: [facility].  Insurance:Medicare/ Blue Cross Blue Shield.  : Patient stated No.     CM explained bout homecare agencies with patient with a verbal understanding. Patient choice [facility] home care services. CM sent referral out awaiting a response back. Will continue to follow case./home

## 2024-03-05 NOTE — PHYSICAL THERAPY INITIAL EVALUATION ADULT - NSACTIVITYREC_GEN_A_PT
evaluation limited due to Pt to receive 1 unit PRBC today after feeling lightheaded/dizzy/nauseous during OT evaluation and needing to return to bed

## 2024-03-05 NOTE — PROGRESS NOTE ADULT - ASSESSMENT
Discharge medication calendar:  Eliquis 2.5mg q12h x 4 weeks  Celecoxib 200mg q12h x 2-3 weeks  Omeprazole 20mg QAM x 4 weeks  APAP 1000mg q8h x 2-3 weeks  Narcotic PRN  Docusate 100mg TID while taking narcotic  Miralax, Senna, or Bisacodyl PRN for treatment of constipation

## 2024-03-05 NOTE — OCCUPATIONAL THERAPY INITIAL EVALUATION ADULT - NSOTDISCHREC_GEN_A_CORE
Recommend supervision/assist with functional activities which pt reports  can provide. CM aware of d/c plans via [facility]. Pt in agreement with d/c plans./Home OT

## 2024-03-05 NOTE — CARE COORDINATION ASSESSMENT. - NSDCPLANSERVICES_GEN_ALL_CORE
DX: 67y/o female who had undergone right hip replacement 2008 presents with right hip pain for several years despite of surgery .she had experienced severe shocking pain , difficulty walking in the month November 2024,f/u Dr Quiroz , CT scan was done which showed loosening of the acetabulum and  blood work with an elevated ESR , cobalt and chromium .Reports constant pain and increased pain when standing , walking  scheduled for right hip replacement on 3/4/24.    CM met with patient at bedside.  Patients H&H 7.2/22.4 CM made Orthro Team aware. Orthro team stated patient will being going to get a unit of blood. This morning with Physical Therapy patient vasovagal  patient bp dropped to 80/50 as per patient stated.  Patient is alert times 3.  Patient stated lives with spouse Pat [phone]. CM called unable to reach will follow up.  Patient stated has 7 steps to enter in the house and 5 steps to enter her bedroom. Patient stated she owns a rolling walker at home. AS per Orthro patient will be discharge tomorrow 3/6/24.    CM Verified:    PCP:DR. Randal Aguilar [phone].  [facility]acy: [facility].  Insurance:Medicare/ Blue Cross Blue Shield.  : Patient stated No.     CM explained bout homecare agencies with patient with a verbal understanding. Patient choice [facility] home care services. CM sent referral out awaiting a response back. Will continue to follow case./Home Care

## 2024-03-05 NOTE — DISCHARGE NOTE PROVIDER - NSDCACTIVITY_GEN_ALL_CORE
Stairs allowed/Walking - Indoors allowed/Walking - Outdoors allowed/Follow Instructions Provided by your Surgical Team Do not drive or operate machinery/Stairs allowed/Walking - Indoors allowed/No heavy lifting/straining/Walking - Outdoors allowed/Follow Instructions Provided by your Surgical Team

## 2024-03-05 NOTE — DISCHARGE NOTE PROVIDER - NSDCCPCAREPLAN_GEN_ALL_CORE_FT
PRINCIPAL DISCHARGE DIAGNOSIS  Diagnosis: Failure of right total hip arthroplasty, initial encounter  Assessment and Plan of Treatment: Physical Therapy /Occupational Therapy for: Ambulation, Transfers , Stairs, ADLs (activities of daily living), isometrics.  TOTAL HIP PRECAUTIONS  *Remember to continue all of the precautions for total hip replacement. Your surgeon will tell you when and if you can move beyond these limitations.  • Do not bend your hip more than 90 degrees   • Do not cross your legs or ankles when laying sitting or standing.  • DO NOT bend over at your waist.  • Avoid sitting in low, soft chairs such as sofas and car seats. You should sit on a chair using firm pillows to raise the height of the seat.  • Make sure your bed level is high, so that you maintain proper leg positioning when sitting on the side, or getting in or out.  • When entering and traveling by car, sit in the front passenger seat. Make sure that the car seat is all the way back and semi-reclined before entering.  • Do not allow your knees to come together when sitting or lying in bed. Use abduction pillow.  • Do not take a tub bath yet.   • Do not resume driving until you have your surgeon’s permission.  Keep incision clean and dry. May shower after surgery if no drainage from incision.  staple removal 2 weeks after surgery at Surgeon's office.   You have a AURORA negative pressure and water resistant dressing over your surgical wound and may shower.  Disconnect AURORA battery prior to showering, reconnect battery after showering and press orange button to resume AURORA power. Remove AURORA dressing 7 days after surgery or when the battery alarms and stops working. If drainage is noted from your wound, apply a dry sterile dressing and call your surgeon.     PRINCIPAL DISCHARGE DIAGNOSIS  Diagnosis: Failure of right total hip arthroplasty, initial encounter  Assessment and Plan of Treatment: Physical Therapy /Occupational Therapy for: Ambulation, Transfers , Stairs, ADLs (activities of daily living), isometrics.  TOTAL HIP PRECAUTIONS  *Remember to continue all of the precautions for total hip replacement. Your surgeon will tell you when and if you can move beyond these limitations.  • Do not bend your hip more than 90 degrees   • Do not cross your legs or ankles when laying sitting or standing.  • DO NOT bend over at your waist.  • Avoid sitting in low, soft chairs such as sofas and car seats. You should sit on a chair using firm pillows to raise the height of the seat.  • Make sure your bed level is high, so that you maintain proper leg positioning when sitting on the side, or getting in or out.  • When entering and traveling by car, sit in the front passenger seat. Make sure that the car seat is all the way back and semi-reclined before entering.  • Do not allow your knees to come together when sitting or lying in bed. Use abduction pillow.  • Do not take a tub bath yet.   • Do not resume driving until you have your surgeon’s permission.  Keep incision clean and dry. May shower after surgery if no drainage from incision.  You have a AURORA negative pressure and water resistant dressing over your surgical wound and may shower.  Disconnect AURORA battery prior to showering, reconnect battery after showering and press orange button to resume AURORA power. Remove AURORA dressing 7 days after surgery or when the battery alarms and stops working. If drainage is noted from your wound, apply a dry sterile dressing and call your surgeon.  Staple removal 2 weeks after surgery at Surgeon's office

## 2024-03-05 NOTE — PHARMACOTHERAPY INTERVENTION NOTE - COMMENTS
Admission medication reconciliation POD1  
Patient tolerated cefazolin perioperatively despite reported allergy to penicillin. Profile updated. 
Patient tolerated cefazolin perioperatively despite reported allergy to penicillin. Profile updated. 
Transition of Care video discharge education - medication calendar given to patient
Admission medication reconciliation POD1

## 2024-03-05 NOTE — OCCUPATIONAL THERAPY INITIAL EVALUATION ADULT - DIAGNOSIS, OT EVAL
Pt with impaired ROM, strength, and balance impacting ability to complete ADLs, IADLs, functional mobility/transfers

## 2024-03-05 NOTE — DISCHARGE NOTE PROVIDER - CARE PROVIDER_API CALL
Melvin Quiroz  Orthopaedic Surgery  [address], [address]  [place] [address]  Phone: [phone]  Fax: [phone]  Scheduled Appointment: 03/22/2024 01:00 PM

## 2024-03-05 NOTE — OCCUPATIONAL THERAPY INITIAL EVALUATION ADULT - ADL RETRAINING, OT EVAL
Pt will complete LB dressing, with AE as needed, with modified independent +set-up by 2-3 sessions. Pt will complete grooming activity while standing at sink +RW with modified independence.

## 2024-03-05 NOTE — DISCHARGE NOTE NURSING/CASE MANAGEMENT/SOCIAL WORK - NSDCDMENAME_GEN_ALL_CORE_FT
[facility] care agency [phone] will reach out to you within 24-72 hours of your discharge to schedule home care visit/eval appointment with you. Please call agency for any queries regarding home care services

## 2024-03-05 NOTE — PHYSICAL THERAPY INITIAL EVALUATION ADULT - GENERAL OBSERVATIONS, REHAB EVAL
Pt rec'd semifowler position in bed. +hip abductor pillow, +SCD's donned + IV Pt agreeable to PT treatment which was tolerated fairly.

## 2024-03-06 VITALS
SYSTOLIC BLOOD PRESSURE: 106 MMHG | DIASTOLIC BLOOD PRESSURE: 68 MMHG | RESPIRATION RATE: 18 BRPM | HEART RATE: 84 BPM | TEMPERATURE: 97 F | OXYGEN SATURATION: 97 %

## 2024-03-06 LAB
HCT VFR BLD CALC: 24.2 % — LOW (ref 34.5–45)
HGB BLD-MCNC: 8 G/DL — LOW (ref 11.5–15.5)
MCHC RBC-ENTMCNC: 29.7 PG — SIGNIFICANT CHANGE UP (ref 27–34)
MCHC RBC-ENTMCNC: 33.1 GM/DL — SIGNIFICANT CHANGE UP (ref 32–36)
MCV RBC AUTO: 90 FL — SIGNIFICANT CHANGE UP (ref 80–100)
NRBC # BLD: 0 /100 WBCS — SIGNIFICANT CHANGE UP (ref 0–0)
PLATELET # BLD AUTO: 171 K/UL — SIGNIFICANT CHANGE UP (ref 150–400)
RBC # BLD: 2.69 M/UL — LOW (ref 3.8–5.2)
RBC # FLD: 16.9 % — HIGH (ref 10.3–14.5)
WBC # BLD: 15.24 K/UL — HIGH (ref 3.8–10.5)
WBC # FLD AUTO: 15.24 K/UL — HIGH (ref 3.8–10.5)

## 2024-03-06 PROCEDURE — C1713: CPT

## 2024-03-06 PROCEDURE — 73502 X-RAY EXAM HIP UNI 2-3 VIEWS: CPT

## 2024-03-06 PROCEDURE — 93971 EXTREMITY STUDY: CPT

## 2024-03-06 PROCEDURE — 80048 BASIC METABOLIC PNL TOTAL CA: CPT

## 2024-03-06 PROCEDURE — 97161 PT EVAL LOW COMPLEX 20 MIN: CPT

## 2024-03-06 PROCEDURE — C1776: CPT

## 2024-03-06 PROCEDURE — 97116 GAIT TRAINING THERAPY: CPT

## 2024-03-06 PROCEDURE — 93971 EXTREMITY STUDY: CPT | Mod: 26,RT

## 2024-03-06 PROCEDURE — 88300 SURGICAL PATH GROSS: CPT

## 2024-03-06 PROCEDURE — 97535 SELF CARE MNGMENT TRAINING: CPT

## 2024-03-06 PROCEDURE — 97530 THERAPEUTIC ACTIVITIES: CPT

## 2024-03-06 PROCEDURE — 97165 OT EVAL LOW COMPLEX 30 MIN: CPT

## 2024-03-06 PROCEDURE — 85027 COMPLETE CBC AUTOMATED: CPT

## 2024-03-06 PROCEDURE — 87075 CULTR BACTERIA EXCEPT BLOOD: CPT

## 2024-03-06 PROCEDURE — 87070 CULTURE OTHR SPECIMN AEROBIC: CPT

## 2024-03-06 PROCEDURE — 73501 X-RAY EXAM HIP UNI 1 VIEW: CPT

## 2024-03-06 PROCEDURE — 36430 TRANSFUSION BLD/BLD COMPNT: CPT

## 2024-03-06 PROCEDURE — 97110 THERAPEUTIC EXERCISES: CPT

## 2024-03-06 PROCEDURE — 99238 HOSP IP/OBS DSCHRG MGMT 30/<: CPT

## 2024-03-06 PROCEDURE — P9016: CPT

## 2024-03-06 PROCEDURE — 36415 COLL VENOUS BLD VENIPUNCTURE: CPT

## 2024-03-06 PROCEDURE — C1889: CPT

## 2024-03-06 RX ORDER — APIXABAN 2.5 MG/1
1 TABLET, FILM COATED ORAL
Qty: 0 | Refills: 0 | DISCHARGE
Start: 2024-03-06

## 2024-03-06 RX ORDER — SENNA PLUS 8.6 MG/1
2 TABLET ORAL
Qty: 0 | Refills: 0 | DISCHARGE
Start: 2024-03-06

## 2024-03-06 RX ORDER — CELECOXIB 200 MG/1
100 CAPSULE ORAL
Refills: 0 | Status: DISCONTINUED | OUTPATIENT
Start: 2024-03-06 | End: 2024-03-06

## 2024-03-06 RX ORDER — OXYCODONE HYDROCHLORIDE 5 MG/1
1 TABLET ORAL
Qty: 0 | Refills: 0 | DISCHARGE
Start: 2024-03-06

## 2024-03-06 RX ORDER — CELECOXIB 200 MG/1
1 CAPSULE ORAL
Qty: 0 | Refills: 0 | DISCHARGE
Start: 2024-03-06

## 2024-03-06 RX ORDER — POLYETHYLENE GLYCOL 3350 17 G/17G
17 POWDER, FOR SOLUTION ORAL
Qty: 0 | Refills: 0 | DISCHARGE
Start: 2024-03-06

## 2024-03-06 RX ADMIN — Medication 75 MICROGRAM(S): at 06:27

## 2024-03-06 RX ADMIN — CELECOXIB 200 MILLIGRAM(S): 200 CAPSULE ORAL at 09:04

## 2024-03-06 RX ADMIN — Medication 1000 MILLIGRAM(S): at 06:27

## 2024-03-06 RX ADMIN — APIXABAN 2.5 MILLIGRAM(S): 2.5 TABLET, FILM COATED ORAL at 09:04

## 2024-03-06 RX ADMIN — Medication 1000 MILLIGRAM(S): at 06:35

## 2024-03-06 RX ADMIN — PANTOPRAZOLE SODIUM 40 MILLIGRAM(S): 20 TABLET, DELAYED RELEASE ORAL at 06:27

## 2024-03-06 NOTE — PROGRESS NOTE ADULT - SUBJECTIVE AND OBJECTIVE BOX
Patient is a 69 yo F with PMH of OA knee who presents for total hip. replacement. Patient reports ambulating on steps with PT today, no loss of sensation in lower extremities.          REVIEW OF SYSTEMS:  CONSTITUTIONAL: No fever, weight loss, or fatigue  EYES: No eye pain, visual disturbances, or discharge  ENMT:  No difficulty hearing, tinnitus, vertigo; No sinus or throat pain  NECK: No pain or stiffness  RESPIRATORY: No cough, wheezing, chills or hemoptysis; No shortness of breath  CARDIOVASCULAR: No chest pain, palpitations, dizziness, or leg swelling  GASTROINTESTINAL: No abdominal or epigastric pain. No nausea, vomiting, or hematemesis; No diarrhea or constipation. No melena or hematochezia.  GENITOURINARY: No dysuria, frequency, hematuria, or incontinence  NEUROLOGICAL: No headaches, memory loss, loss of strength, numbness, or tremors  SKIN: No itching, burning, rashes, or lesions   LYMPH NODES: No enlarged glands  ENDOCRINE: No heat or cold intolerance; No hair loss; No polydipsia or polyuria  MUSCULOSKELETAL: No joint pain or swelling; No muscle, back, or extremity pain  HEME/LYMPH: No easy bruising, or bleeding gums  ALLERGY AND IMMUNOLOGIC: No hives or eczema      GENERAL: patient appears well, no acute distress, appropriate behavior  EYES: sclera clear, no exudates, PERRLA  ENMT: moist mucous membranes, oropharynx clear without erythema, no exudates  LUNGS:  no increased work of breathing, no wheezing appreciated  HEART: no lower extremity edema appreciated  GASTROINTESTINAL: abdomen is soft, nontender, nondistended, no palpable masses appreciated  INTEGUMENT: good skin turgor, warm, dry and intact, no lesions appreciated  MUSCULOSKELETAL: no clubbing or cyanosis, no obvious deformity  NEUROLOGIC: awake, alert, oriented x3, strength no obvious sensory deficits  PSYCHIATRIC: mood is good, affect is congruent, linear and logical thought process  HEME/LYMPH:  no obvious ecchymosis or petechiae     Patient is a 67 yo F with PMH of OA knee who presents for total hip. replacement. Patient reports ambulating on steps with PT today, no loss of sensation in lower extremities.          REVIEW OF SYSTEMS:  CONSTITUTIONAL: No fever, weight loss, or fatigue  EYES: No eye pain, visual disturbances, or discharge  ENMT:  No difficulty hearing, tinnitus, vertigo; No sinus or throat pain  NECK: No pain or stiffness  RESPIRATORY: No cough, wheezing, chills or hemoptysis; No shortness of breath  CARDIOVASCULAR: No chest pain, palpitations, dizziness, or leg swelling  GASTROINTESTINAL: No abdominal or epigastric pain. No nausea, vomiting, or diarrhea  GENITOURINARY: No dysuria, frequency, hematuria, or incontinence  NEUROLOGICAL: No headaches, memory loss, loss of strength, numbness, or tremors  SKIN: No itching, burning, rashes, or lesions   MUSCULOSKELETAL: No joint pain or swelling; No muscle, back, or extremity pain        GENERAL: patient appears well, no acute distress, appropriate behavior  EYES: sclera clear, no exudates, PERRLA  ENMT: moist mucous membranes, oropharynx clear without erythema, no exudates  LUNGS:  no increased work of breathing, no wheezing appreciated  HEART: no lower extremity edema appreciated  GASTROINTESTINAL: abdomen is nondistended  INTEGUMENT: good skin turgor, warm, dry and intact, no lesions appreciated  MUSCULOSKELETAL: no clubbing or cyanosis, no obvious deformity  NEUROLOGIC: awake, alert, oriented x3, strength no obvious sensory deficits  PSYCHIATRIC: mood is good, affect is congruent, linear and logical thought process  HEME/LYMPH:  no obvious ecchymosis or petechiae

## 2024-03-06 NOTE — PROGRESS NOTE ADULT - SUBJECTIVE AND OBJECTIVE BOX
Orthopedic Progress Note     Interval History:  No acute events overnight, pain is well controlled.  Patient denies any chest pain, SOB, N/V, fevers/chills.    Exam:  T(C): 36.7 (03-05-24 @ 23:30), Max: 37.2 (03-05-24 @ 16:23)  HR: 88 (03-05-24 @ 23:30) (81 - 97)  BP: 94/59 (03-05-24 @ 23:30) (90/56 - 106/66)  RR: 18 (03-05-24 @ 23:30) (16 - 18)  SpO2: 96% (03-05-24 @ 23:30) (96% - 99%)  Wt(kg): --I&O's Summary      Lying in bed in no apparent distress  Unlabored respirations    EXT:   Dressing clean, dry and intact.   Abduction pillow  Compression stocking in place.   Sensation is intact in the superficial peroneal, deep peroneal, tibial, sural and saphenous nerve distributions  Able to dorsiflex and plantarflex ankle. Wiggles toes  Foot warm and well perfused  Sequential compression device on            Labs:                        7.2    11.84 )-----------( 187      ( 05 Mar 2024 08:17 )             22.4    03-05    141  |  107  |  20  ----------------------------<  129<H>  4.7   |  22  |  0.99    Ca    7.8<L>      05 Mar 2024 08:17        Assessment/ Plan: YANG OROURKE is postoperative day #2 s/p posterior revision right total hip replacement. Overall doing well. Patient with postoperative anemia s/p transfusion on 3/5/2024  - f/u AM labs  - please ensure ice over operative site while in bed or chair.   - Posterior hip precautions. Abduction pillow  - PT/ OT for rehabilitation. WBAT with ROM activities as tolerated  - multimodal pain regimen.   - IV surgical prophylactic antibiotics x 24 hours. ***Cefadroxil 500 BID x 7 days for high risk prophylaxis***  - chemical DVT prophylaxis per protocol  - SCDs while in bed. Out of bed to chair   - incentive spirometry  - disposition planning. Call [phone] to confirm or change postoperative appointment.  - page orthopedics team with questions or concerns.

## 2024-03-06 NOTE — PROGRESS NOTE ADULT - ASSESSMENT
68-year-old female with past medical history of hypothyroidism, cholecystectomy, osteoarthritis, who has been having increasing pain in her right hip. patient had undergone right hip replacement in the past. 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  Patient was scheduled for revision right total hip replacement.  Patient is seen postoperatively for medical comanagement.    #Right hip pain  status post revision right total hip arthroplasty POD1  Maintain posterior hip precautions.  continue perioperative antibiotics per protocol  Continue IV hydration per routine  patient will be started on Eliquis for DVT prophylaxis  Continue patient on multimodal pain management with Tylenol/Celebrex/oxycodone/Dilaudid as needed  Encourage incentive spirometry  Continue physical therapy  medically stable for discharge    #Anemia  Likely post op, no signs of active bleeding  Transfuse 1unit of pRBC for Hgb 10-->7.2  Check post transfusion CBC    #Hypotension  Patient w/ soft blood pressures  Hydrate  Transfuse as above    #Hypothyroidism  continue patient on levothyroxine    #Prophylactic measure.   Eliquis for DVT prophylaxis   PPI for GI prophylaxis 68-year-old female with past medical history of hypothyroidism, cholecystectomy, osteoarthritis, who has been having increasing pain in her right hip. patient had undergone right hip replacement in the past. 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  Patient was scheduled for revision right total hip replacement.  Patient is seen postoperatively for medical comanagement.    #Right hip pain  status post revision right total hip arthroplasty   continue postop antibiotics per surgery, patient is considered high risk  Continue IV hydration per routine  patient will be started on Eliquis for DVT prophylaxis  Continue patient on multimodal pain management with Tylenol/Celebrex/oxycodone/Dilaudid as needed  Encourage incentive spirometry  Continue physical therapy  medically stable for discharge    #acute blood loss Anemia  Likely post op, no signs of active bleeding  s/p 1unit of pRBC for Hgb 10-->7.2 -> 8.0    #Hypotension  Patient w/ soft blood pressures  appears resolved, asymptomatic    #Hypothyroidism  continue patient on levothyroxine    #Prophylactic measure.   Eliquis for DVT prophylaxis   PPI for GI prophylaxis

## 2024-03-06 NOTE — PROGRESS NOTE ADULT - SUBJECTIVE AND OBJECTIVE BOX
SUBJECTIVE: Patient seen and examined. No nausea/vomiting, nor shortness of breath. Patient is complaining of leg pain especially in the mid thigh area. She denies of spasms and worried of blood clots secondary to family history. She feels that it can't be spasms but she is nervous. Spoken with Dr Quiroz and agrees with doppler and would like the patient to start on cyclobenzaprine 5 mg as needed.  Patient was transfused yesterday with little participation     OBJECTIVE:     Vital Signs Last 24 Hrs  T(C): 36.7 (05 Mar 2024 23:30), Max: 37.2 (05 Mar 2024 16:23)  T(F): 98.1 (05 Mar 2024 23:30), Max: 98.9 (05 Mar 2024 16:23)  HR: 88 (05 Mar 2024 23:30) (81 - 97)  BP: 94/59 (05 Mar 2024 23:30) (90/56 - 106/66)  BP(mean): --  RR: 18 (05 Mar 2024 23:30) (16 - 18)  SpO2: 96% (05 Mar 2024 23:30) (96% - 99%)    Parameters below as of 05 Mar 2024 16:23  Patient On (Oxygen Delivery Method): room air        PAIN SCORE:     4    SCALE USED: (1-10 VNRS)        Affected extremity:          Dressing: clean/dry/intact post operative dressing of AURORA, green light on the battery         Sensation:  intact to bilateral feet, PAS to bilateral legs         Motor exam:          5/ 5 Tibialis Anterior/Gastrocnemius-Soleus , EHL         warm well perfused; capillary refill <3 seconds     LABS:                        7.2    11.84 )-----------( 187      ( 05 Mar 2024 08:17 )             22.4     03-05    141  |  107  |  20  ----------------------------<  129<H>  4.7   |  22  |  0.99    Ca    7.8<L>      05 Mar 2024 08:17      MEDICATIONS:    Anticoagulation:  apixaban 2.5 milliGRAM(s) Oral every 12 hours      Antibiotics: finished      Pain medications:   acetaminophen     Tablet .. 1000 milliGRAM(s) Oral every 8 hours  celecoxib 200 milliGRAM(s) Oral every 12 hours  HYDROmorphone  Injectable 0.5 milliGRAM(s) IV Push every 3 hours PRN  ondansetron Injectable 4 milliGRAM(s) IV Push every 6 hours PRN  oxyCODONE    IR 10 milliGRAM(s) Oral every 3 hours PRN  oxyCODONE    IR 5 milliGRAM(s) Oral every 3 hours PRN      A/P :  s/p Right THR revision posterior approach   POD # 2  -    Pain control  -    DVT ppx: Eliquis  -    Check CBC today for patient being transfused yesterday  -    Weight bearing status: WBAT   -    Physical Therapy  -    Dispo: Home  SUBJECTIVE: Patient seen and examined. No nausea/vomiting, nor shortness of breath. Patient is complaining of leg pain especially in the mid thigh area. She denies of spasms and worried of blood clots secondary to family history. She feels that it can't be spasms but she is nervous. Spoken with Dr Quiroz and agrees with doppler and would like the patient to start on cyclobenzaprine 5 mg as needed.  Patient was transfused yesterday with little participation     OBJECTIVE:     Vital Signs Last 24 Hrs  T(C): 36.7 (05 Mar 2024 23:30), Max: 37.2 (05 Mar 2024 16:23)  T(F): 98.1 (05 Mar 2024 23:30), Max: 98.9 (05 Mar 2024 16:23)  HR: 88 (05 Mar 2024 23:30) (81 - 97)  BP: 94/59 (05 Mar 2024 23:30) (90/56 - 106/66)  BP(mean): --  RR: 18 (05 Mar 2024 23:30) (16 - 18)  SpO2: 96% (05 Mar 2024 23:30) (96% - 99%)    Parameters below as of 05 Mar 2024 16:23  Patient On (Oxygen Delivery Method): room air        PAIN SCORE:     4    SCALE USED: (1-10 VNRS)        Affected extremity:          Dressing: clean/dry/intact post operative dressing of AURORA, green light on the battery         Sensation:  intact to bilateral feet, PAS to bilateral legs         Motor exam:          5/ 5 Tibialis Anterior/Gastrocnemius-Soleus , EHL         warm well perfused; capillary refill <3 seconds     LABS:                        7.2    11.84 )-----------( 187      ( 05 Mar 2024 08:17 )             22.4     03-05    141  |  107  |  20  ----------------------------<  129<H>  4.7   |  22  |  0.99    Ca    7.8<L>      05 Mar 2024 08:17      MEDICATIONS:    Anticoagulation:  apixaban 2.5 milliGRAM(s) Oral every 12 hours      Antibiotics: finished      Pain medications:   acetaminophen     Tablet .. 1000 milliGRAM(s) Oral every 8 hours  celecoxib 200 milliGRAM(s) Oral every 12 hours  HYDROmorphone  Injectable 0.5 milliGRAM(s) IV Push every 3 hours PRN  ondansetron Injectable 4 milliGRAM(s) IV Push every 6 hours PRN  oxyCODONE    IR 10 milliGRAM(s) Oral every 3 hours PRN  oxyCODONE    IR 5 milliGRAM(s) Oral every 3 hours PRN      A/P :  s/p Right THR revision posterior approach   POD # 2  -    Pain control  -    DVT ppx: Eliquis  -    Check CBC today for patient being transfused yesterday  -    Weight bearing status: WBAT   -    Physical Therapy  -    Dispo: rehab

## 2024-03-07 ENCOUNTER — TRANSCRIPTION ENCOUNTER (OUTPATIENT)
Age: 69
End: 2024-03-07

## 2024-03-10 LAB

## 2024-03-14 DIAGNOSIS — B37.9 CANDIDIASIS, UNSPECIFIED: ICD-10-CM

## 2024-03-14 RX ORDER — FLUCONAZOLE 150 MG/1
150 TABLET ORAL
Qty: 2 | Refills: 0 | Status: ACTIVE | COMMUNITY
Start: 2024-03-14 | End: 1900-01-01

## 2024-03-21 ENCOUNTER — APPOINTMENT (OUTPATIENT)
Dept: ORTHOPEDIC SURGERY | Facility: CLINIC | Age: 69
End: 2024-03-21
Payer: MEDICARE

## 2024-03-21 VITALS — HEART RATE: 78 BPM | DIASTOLIC BLOOD PRESSURE: 83 MMHG | SYSTOLIC BLOOD PRESSURE: 136 MMHG

## 2024-03-21 DIAGNOSIS — Z96.641 PRESENCE OF RIGHT ARTIFICIAL HIP JOINT: ICD-10-CM

## 2024-03-21 PROCEDURE — 73502 X-RAY EXAM HIP UNI 2-3 VIEWS: CPT | Mod: RT

## 2024-03-21 PROCEDURE — 99024 POSTOP FOLLOW-UP VISIT: CPT

## 2024-03-21 NOTE — HISTORY OF PRESENT ILLNESS
[de-identified] : YANG OROURKE  is an 68 year-old female presents today status post revision right total hip arthroplasty on 3/4/2024 for followup. The patient is living at home. The patient has maintained weight bearing as tolerated and hip precautions. The patient is ambulating with an assistive device and working with physical therapy. The patient is weaning off all narcotic pain medicine. The patient is progressing well and is happy with the progress.   No fever, chills, or signs of infection. Today, patient does not state any other associated signs or complaints outside of those described. The patient presents for postoperative followup.

## 2024-03-21 NOTE — DISCUSSION/SUMMARY
[de-identified] : Now s/p l right revision HOLA on 3/4/2024 for follow-up. Overall doing well.   At this point we have suggested continued exercises and formal physical therapy with Rx given. Appropriate instructions regarding further care, restrictions, and further recovery has been given. They know to complete the prescribed four weeks of DVT prophylaxis.   We have also counseled the patient to avoid any dental procedures for the first three months postoperatively. We remain available for any further questions and concerns and instructed patient that we would like to see them if any concerns for infection including fevers, redness, or increased swelling.

## 2024-03-21 NOTE — PHYSICAL EXAM
[de-identified] : General Appearance / Station: Well developed, well nourished, in no acute distress Orientation: Oriented to person, place, and time Gait & Station: Ambulates with assistive device Neurologic: Normal leg sensation Cardiovascular: Warm extremity Lymphatics: No lymphedema  OPERATIVE HIP Skin: incision clean, dry and intact. Well healing. Wound cleaned with ChloraPrep and staples removed.  Steri-Strips applied. No erythema, warmth or tenderness. Range of motion: Painless internal and external rotation of the hip. Strength: Within Normal Limits. Negative Trendelenburg sign                                                                      Neurovascular Exam: Able to wiggle toes and dorsiflex/ plantarflex ankle. Foot warm, well perfused        [de-identified] : Imaging: AP Pelvis and lateral views of the right hip show satisfactory placement of a right cementless total hip arthroplasty with new acetabular component there are no changes in alignment of hardware and no signs of radiographic failure compared to previous radiographs.

## 2024-04-02 RX ORDER — CELECOXIB 200 MG/1
200 CAPSULE ORAL
Qty: 30 | Refills: 0 | Status: ACTIVE | COMMUNITY
Start: 2024-04-02 | End: 1900-01-01

## 2024-04-09 DIAGNOSIS — Z29.9 ENCOUNTER FOR PROPHYLACTIC MEASURES, UNSPECIFIED: ICD-10-CM

## 2024-04-09 RX ORDER — OMEPRAZOLE 20 MG/1
20 CAPSULE, DELAYED RELEASE ORAL DAILY
Qty: 14 | Refills: 0 | Status: ACTIVE | COMMUNITY
Start: 2024-04-09 | End: 1900-01-01

## 2024-04-18 ENCOUNTER — APPOINTMENT (OUTPATIENT)
Dept: ORTHOPEDIC SURGERY | Facility: CLINIC | Age: 69
End: 2024-04-18
Payer: MEDICARE

## 2024-04-18 VITALS — DIASTOLIC BLOOD PRESSURE: 79 MMHG | SYSTOLIC BLOOD PRESSURE: 159 MMHG | HEART RATE: 74 BPM

## 2024-04-18 PROCEDURE — 73502 X-RAY EXAM HIP UNI 2-3 VIEWS: CPT | Mod: RT

## 2024-04-18 PROCEDURE — 99024 POSTOP FOLLOW-UP VISIT: CPT

## 2024-04-18 RX ORDER — CELECOXIB 200 MG/1
200 CAPSULE ORAL
Qty: 30 | Refills: 1 | Status: ACTIVE | COMMUNITY
Start: 2024-04-18 | End: 1900-01-01

## 2024-04-18 NOTE — PHYSICAL EXAM
[de-identified] : General Appearance / Station: Well developed, well nourished, in no acute distress Orientation: Oriented to person, place, and time Gait & Station: Ambulates with assistive device Neurologic: Normal leg sensation Cardiovascular: Warm extremity Lymphatics: No lymphedema  OPERATIVE HIP Skin: incision well healing. No erythema, warmth or tenderness. Range of motion: Painless internal and external rotation of the hip. Strength: Within Normal Limits. Negative Trendelenburg sign                                                                      Neurovascular Exam: Able to wiggle toes and dorsiflex/ plantarflex ankle. Foot warm, well perfused        [de-identified] : Imaging: AP Pelvis and lateral views of the right hip show satisfactory placement of a right cementless total hip arthroplasty with new acetabular component there are no changes in alignment of hardware and no signs of radiographic failure compared to previous radiographs.

## 2024-04-18 NOTE — DISCUSSION/SUMMARY
[de-identified] : Now s/p right revision HOLA on 3/4/2024 for follow-up. Overall doing well.   At this point we have suggested continued exercises and formal physical therapy. Appropriate instructions regarding further care, restrictions, and further recovery has been given.    We remain available for any further questions and concerns and instructed patient that we would like to see them if any concerns for infection including fevers, redness, or increased swelling.  Follow-up in 2-3 months

## 2024-04-18 NOTE — HISTORY OF PRESENT ILLNESS
[de-identified] : YANG OROURKE  is an 68 year-old female presents today status post revision right total hip arthroplasty on 3/4/2024 for followup. The patient is living at home. The patient has maintained weight bearing as tolerated and hip precautions. The patient is ambulating with an assistive device and working with physical therapy. The patient is weaning off all narcotic pain medicine. The patient is progressing well and is happy with the progress.  She reports that her pain dramatically improved over the past week and she is now able to lift her leg into the bed which she has not been able to do for the past 20 years   No fever, chills, or signs of infection. Today, patient does not state any other associated signs or complaints outside of those described. The patient presents for postoperative followup.

## 2024-04-19 ENCOUNTER — NON-APPOINTMENT (OUTPATIENT)
Age: 69
End: 2024-04-19

## 2024-06-18 NOTE — OCCUPATIONAL THERAPY INITIAL EVALUATION ADULT - VISUAL ASSESSMENT: EYE MUSCLE BALANCE
Detail Level: Zone
Initiate Treatment: Triamcinolone BID x 2 weeks. \\nEpiceram BID
Render In Strict Bullet Format?: No
normal

## 2024-06-19 ENCOUNTER — NON-APPOINTMENT (OUTPATIENT)
Age: 69
End: 2024-06-19

## 2024-07-12 ENCOUNTER — APPOINTMENT (OUTPATIENT)
Dept: ORTHOPEDIC SURGERY | Facility: CLINIC | Age: 69
End: 2024-07-12

## 2024-07-12 VITALS — HEART RATE: 80 BPM | SYSTOLIC BLOOD PRESSURE: 126 MMHG | DIASTOLIC BLOOD PRESSURE: 88 MMHG

## 2024-07-12 PROCEDURE — 99213 OFFICE O/P EST LOW 20 MIN: CPT

## 2024-07-12 PROCEDURE — 73502 X-RAY EXAM HIP UNI 2-3 VIEWS: CPT
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
## 2025-01-03 ENCOUNTER — NON-APPOINTMENT (OUTPATIENT)
Age: 70
End: 2025-01-03

## 2025-03-14 ENCOUNTER — APPOINTMENT (OUTPATIENT)
Dept: ORTHOPEDIC SURGERY | Facility: CLINIC | Age: 70
End: 2025-03-14

## 2025-03-14 PROCEDURE — 99214 OFFICE O/P EST MOD 30 MIN: CPT

## 2025-03-14 PROCEDURE — 73502 X-RAY EXAM HIP UNI 2-3 VIEWS: CPT

## 2025-03-18 LAB
CRP SERPL-MCNC: <3 MG/L
ERYTHROCYTE [SEDIMENTATION RATE] IN BLOOD BY WESTERGREN METHOD: 12 MM/HR

## 2025-03-31 ENCOUNTER — APPOINTMENT (OUTPATIENT)
Dept: CT IMAGING | Facility: CLINIC | Age: 70
End: 2025-03-31
Payer: MEDICARE

## 2025-03-31 ENCOUNTER — RESULT REVIEW (OUTPATIENT)
Age: 70
End: 2025-03-31

## 2025-03-31 PROCEDURE — 76376 3D RENDER W/INTRP POSTPROCES: CPT

## 2025-03-31 PROCEDURE — 73700 CT LOWER EXTREMITY W/O DYE: CPT | Mod: LT

## 2025-04-11 ENCOUNTER — APPOINTMENT (OUTPATIENT)
Dept: ORTHOPEDIC SURGERY | Facility: CLINIC | Age: 70
End: 2025-04-11
Payer: MEDICARE

## 2025-04-11 PROCEDURE — 99213 OFFICE O/P EST LOW 20 MIN: CPT

## 2025-05-12 ENCOUNTER — APPOINTMENT (OUTPATIENT)
Dept: NUCLEAR MEDICINE | Facility: CLINIC | Age: 70
End: 2025-05-12

## 2025-05-12 ENCOUNTER — RESULT REVIEW (OUTPATIENT)
Age: 70
End: 2025-05-12

## 2025-05-12 ENCOUNTER — OUTPATIENT (OUTPATIENT)
Dept: OUTPATIENT SERVICES | Facility: HOSPITAL | Age: 70
LOS: 1 days | End: 2025-05-12
Payer: MEDICARE

## 2025-05-12 DIAGNOSIS — Z96.642 PRESENCE OF LEFT ARTIFICIAL HIP JOINT: Chronic | ICD-10-CM

## 2025-05-12 DIAGNOSIS — Z90.710 ACQUIRED ABSENCE OF BOTH CERVIX AND UTERUS: Chronic | ICD-10-CM

## 2025-05-12 DIAGNOSIS — Z98.890 OTHER SPECIFIED POSTPROCEDURAL STATES: Chronic | ICD-10-CM

## 2025-05-12 DIAGNOSIS — Z98.84 BARIATRIC SURGERY STATUS: Chronic | ICD-10-CM

## 2025-05-12 DIAGNOSIS — Z96.642 PRESENCE OF LEFT ARTIFICIAL HIP JOINT: ICD-10-CM

## 2025-05-12 DIAGNOSIS — Z90.49 ACQUIRED ABSENCE OF OTHER SPECIFIED PARTS OF DIGESTIVE TRACT: Chronic | ICD-10-CM

## 2025-05-12 DIAGNOSIS — Z96.641 PRESENCE OF RIGHT ARTIFICIAL HIP JOINT: Chronic | ICD-10-CM

## 2025-05-12 PROCEDURE — 78830 RP LOCLZJ TUM SPECT W/CT 1: CPT | Mod: 26

## 2025-05-12 PROCEDURE — 78315 BONE IMAGING 3 PHASE: CPT | Mod: 26

## 2025-05-16 ENCOUNTER — APPOINTMENT (OUTPATIENT)
Dept: ORTHOPEDIC SURGERY | Facility: CLINIC | Age: 70
End: 2025-05-16
Payer: MEDICARE

## 2025-05-16 PROCEDURE — 99213 OFFICE O/P EST LOW 20 MIN: CPT

## 2025-06-07 ENCOUNTER — NON-APPOINTMENT (OUTPATIENT)
Age: 70
End: 2025-06-07

## 2025-06-09 ENCOUNTER — APPOINTMENT (OUTPATIENT)
Dept: COLORECTAL SURGERY | Facility: CLINIC | Age: 70
End: 2025-06-09
Payer: MEDICARE

## 2025-06-09 VITALS
DIASTOLIC BLOOD PRESSURE: 86 MMHG | WEIGHT: 185 LBS | SYSTOLIC BLOOD PRESSURE: 135 MMHG | BODY MASS INDEX: 32.78 KG/M2 | HEART RATE: 83 BPM | RESPIRATION RATE: 16 BRPM | TEMPERATURE: 97.7 F | HEIGHT: 63 IN | OXYGEN SATURATION: 98 %

## 2025-06-09 PROCEDURE — 99204 OFFICE O/P NEW MOD 45 MIN: CPT

## 2025-06-09 RX ORDER — LEVOTHYROXINE SODIUM 137 UG/1
TABLET ORAL
Refills: 0 | Status: ACTIVE | COMMUNITY

## 2025-06-10 RX ORDER — SODIUM PICOSULFATE, MAGNESIUM OXIDE, AND ANHYDROUS CITRIC ACID 12; 3.5; 1 G/175ML; G/175ML; MG/175ML
10-3.5-12 MG-GM LIQUID ORAL AS DIRECTED
Qty: 1 | Refills: 0 | Status: ACTIVE | COMMUNITY
Start: 2025-06-10 | End: 1900-01-01

## 2025-07-10 ENCOUNTER — APPOINTMENT (OUTPATIENT)
Dept: COLORECTAL SURGERY | Facility: CLINIC | Age: 70
End: 2025-07-10
Payer: MEDICARE

## 2025-07-10 PROCEDURE — 45378 DIAGNOSTIC COLONOSCOPY: CPT

## (undated) DEVICE — GLV 8 PROTEXIS (BLUE)

## (undated) DEVICE — DRILL BIT MICROAIRE TWIST 2.4MM X 127MM

## (undated) DEVICE — VENODYNE/SCD SLEEVE CALF MEDIUM

## (undated) DEVICE — DRSG WEBRIL 4"

## (undated) DEVICE — DRAPE HIP W POUCHES 87X115X134"

## (undated) DEVICE — SYR LUER LOK 50CC

## (undated) DEVICE — CANISTER SUCTION LID GUARD 3000CC

## (undated) DEVICE — POSITIONER STRAP ARMBOARD VELCRO TS-30

## (undated) DEVICE — BAG SPONGE COUNTER EZ

## (undated) DEVICE — NDL SPINAL 18G X 3.5" (PINK)

## (undated) DEVICE — HOOD FLYTE STRYKER HELMET SHIELD

## (undated) DEVICE — POSITIONER FOAM ABDUCTION PILLOW MED (PINK)

## (undated) DEVICE — VENODYNE/SCD SLEEVE CALF BARIATRIC

## (undated) DEVICE — SUT MONOSOF 3-0 30" C-16

## (undated) DEVICE — ELCTR ROCKER SWITCH PENCIL BLUE 10FT

## (undated) DEVICE — WOUND IRR IRRISEPT W 0.5 CHG

## (undated) DEVICE — ELCTR AQUAMANTYS BIPOLAR SEALER 6.0

## (undated) DEVICE — POSITIONER STIRRUP STRAP W SLIP RING 19X3.5"

## (undated) DEVICE — SOL IRR POUR NS 0.9% 500ML

## (undated) DEVICE — SUT VICRYL PLUS 1 27" CP UNDYED

## (undated) DEVICE — SOL IRR POUR H2O 500ML

## (undated) DEVICE — DRSG XEROFORM 5 X 9"

## (undated) DEVICE — PACK TOTAL HIP

## (undated) DEVICE — SOLIDIFIER CANN EXPRESS 3K

## (undated) DEVICE — SOL IRR POUR H2O 250ML

## (undated) DEVICE — WARMING BLANKET UPPER ADULT

## (undated) DEVICE — SUT ETHIBOND 1 30" OS6

## (undated) DEVICE — MIDAS REX MR8 METAL CUTTER DIAMOND WHEEL 25.4MM X 9CM

## (undated) DEVICE — POSITIONER FOAM HEAD CRADLE (PINK)

## (undated) DEVICE — GOWN XL W TOWEL

## (undated) DEVICE — TOURNIQUET CUFF 34" DUAL PORT W PLC

## (undated) DEVICE — SAW BLADE STRYKER SAGITTAL AGGRESSIVE 25X86.5X1.32MM

## (undated) DEVICE — DRILL BIT BIOMET RINGLOCK QUICK CONNECT 3.2MM X 30MM

## (undated) DEVICE — SPECIMEN CONTAINER PET

## (undated) DEVICE — SOL IRR BAG NS 0.9% 3000ML